# Patient Record
Sex: FEMALE | Race: BLACK OR AFRICAN AMERICAN | Employment: FULL TIME | URBAN - METROPOLITAN AREA
[De-identification: names, ages, dates, MRNs, and addresses within clinical notes are randomized per-mention and may not be internally consistent; named-entity substitution may affect disease eponyms.]

---

## 2018-01-19 ENCOUNTER — OFFICE VISIT (OUTPATIENT)
Dept: FAMILY MEDICINE CLINIC | Facility: CLINIC | Age: 43
End: 2018-01-19

## 2018-01-19 ENCOUNTER — LAB ENCOUNTER (OUTPATIENT)
Dept: LAB | Age: 43
End: 2018-01-19
Attending: FAMILY MEDICINE
Payer: COMMERCIAL

## 2018-01-19 VITALS
WEIGHT: 135 LBS | RESPIRATION RATE: 16 BRPM | DIASTOLIC BLOOD PRESSURE: 62 MMHG | BODY MASS INDEX: 23.92 KG/M2 | HEART RATE: 78 BPM | SYSTOLIC BLOOD PRESSURE: 100 MMHG | HEIGHT: 63 IN | TEMPERATURE: 99 F

## 2018-01-19 DIAGNOSIS — E53.8 VITAMIN B 12 DEFICIENCY: ICD-10-CM

## 2018-01-19 DIAGNOSIS — Z13.89 SCREENING FOR GENITOURINARY CONDITION: ICD-10-CM

## 2018-01-19 DIAGNOSIS — E55.9 VITAMIN D DEFICIENCY: ICD-10-CM

## 2018-01-19 DIAGNOSIS — Z00.00 PHYSICAL EXAM, ANNUAL: Primary | ICD-10-CM

## 2018-01-19 DIAGNOSIS — Z00.00 LABORATORY EXAMINATION ORDERED AS PART OF A ROUTINE GENERAL MEDICAL EXAMINATION: ICD-10-CM

## 2018-01-19 LAB
25-HYDROXYVITAMIN D (TOTAL): 10 NG/ML (ref 30–100)
ALBUMIN SERPL-MCNC: 4.1 G/DL (ref 3.5–4.8)
ALP LIVER SERPL-CCNC: 67 U/L (ref 37–98)
ALT SERPL-CCNC: 13 U/L (ref 14–54)
AST SERPL-CCNC: 14 U/L (ref 15–41)
BASOPHILS # BLD AUTO: 0.02 X10(3) UL (ref 0–0.1)
BASOPHILS NFR BLD AUTO: 0.3 %
BILIRUB SERPL-MCNC: 0.4 MG/DL (ref 0.1–2)
BILIRUB UR QL STRIP.AUTO: NEGATIVE
BUN BLD-MCNC: 10 MG/DL (ref 8–20)
CALCIUM BLD-MCNC: 8.8 MG/DL (ref 8.3–10.3)
CHLORIDE: 106 MMOL/L (ref 101–111)
CHOLEST SMN-MCNC: 184 MG/DL (ref ?–200)
CLARITY UR REFRACT.AUTO: CLEAR
CO2: 26 MMOL/L (ref 22–32)
COLOR UR AUTO: YELLOW
CREAT BLD-MCNC: 0.9 MG/DL (ref 0.55–1.02)
EOSINOPHIL # BLD AUTO: 0.06 X10(3) UL (ref 0–0.3)
EOSINOPHIL NFR BLD AUTO: 1 %
ERYTHROCYTE [DISTWIDTH] IN BLOOD BY AUTOMATED COUNT: 13.1 % (ref 11.5–16)
GLUCOSE BLD-MCNC: 82 MG/DL (ref 70–99)
GLUCOSE UR STRIP.AUTO-MCNC: NEGATIVE MG/DL
HAV AB SERPL IA-ACNC: 242 PG/ML (ref 193–986)
HCT VFR BLD AUTO: 40.8 % (ref 34–50)
HDLC SERPL-MCNC: 80 MG/DL (ref 45–?)
HDLC SERPL: 2.3 {RATIO} (ref ?–4.44)
HGB BLD-MCNC: 13.5 G/DL (ref 12–16)
IMMATURE GRANULOCYTE COUNT: 0.01 X10(3) UL (ref 0–1)
IMMATURE GRANULOCYTE RATIO %: 0.2 %
KETONES UR STRIP.AUTO-MCNC: NEGATIVE MG/DL
LDLC SERPL CALC-MCNC: 95 MG/DL (ref ?–130)
LEUKOCYTE ESTERASE UR QL STRIP.AUTO: NEGATIVE
LYMPHOCYTES # BLD AUTO: 2.26 X10(3) UL (ref 0.9–4)
LYMPHOCYTES NFR BLD AUTO: 37.3 %
M PROTEIN MFR SERPL ELPH: 8.1 G/DL (ref 6.1–8.3)
MCH RBC QN AUTO: 30.9 PG (ref 27–33.2)
MCHC RBC AUTO-ENTMCNC: 33.1 G/DL (ref 31–37)
MCV RBC AUTO: 93.4 FL (ref 81–100)
MONOCYTES # BLD AUTO: 0.45 X10(3) UL (ref 0.1–0.6)
MONOCYTES NFR BLD AUTO: 7.4 %
NEUTROPHIL ABS PRELIM: 3.26 X10 (3) UL (ref 1.3–6.7)
NEUTROPHILS # BLD AUTO: 3.26 X10(3) UL (ref 1.3–6.7)
NEUTROPHILS NFR BLD AUTO: 53.8 %
NITRITE UR QL STRIP.AUTO: NEGATIVE
NONHDLC SERPL-MCNC: 104 MG/DL (ref ?–130)
PH UR STRIP.AUTO: 6 [PH] (ref 4.5–8)
PLATELET # BLD AUTO: 243 10(3)UL (ref 150–450)
POTASSIUM SERPL-SCNC: 4.4 MMOL/L (ref 3.6–5.1)
PROT UR STRIP.AUTO-MCNC: NEGATIVE MG/DL
RBC # BLD AUTO: 4.37 X10(6)UL (ref 3.8–5.1)
RBC UR QL AUTO: NEGATIVE
RED CELL DISTRIBUTION WIDTH-SD: 45 FL (ref 35.1–46.3)
SODIUM SERPL-SCNC: 138 MMOL/L (ref 136–144)
SP GR UR STRIP.AUTO: 1.02 (ref 1–1.03)
TRIGL SERPL-MCNC: 47 MG/DL (ref ?–150)
TSI SER-ACNC: 0.9 MIU/ML (ref 0.35–5.5)
UROBILINOGEN UR STRIP.AUTO-MCNC: <2 MG/DL
VLDLC SERPL CALC-MCNC: 9 MG/DL (ref 5–40)
WBC # BLD AUTO: 6.1 X10(3) UL (ref 4–13)

## 2018-01-19 PROCEDURE — 82607 VITAMIN B-12: CPT

## 2018-01-19 PROCEDURE — 81003 URINALYSIS AUTO W/O SCOPE: CPT

## 2018-01-19 PROCEDURE — 85025 COMPLETE CBC W/AUTO DIFF WBC: CPT

## 2018-01-19 PROCEDURE — 84443 ASSAY THYROID STIM HORMONE: CPT

## 2018-01-19 PROCEDURE — 80061 LIPID PANEL: CPT

## 2018-01-19 PROCEDURE — 82306 VITAMIN D 25 HYDROXY: CPT

## 2018-01-19 PROCEDURE — 99396 PREV VISIT EST AGE 40-64: CPT | Performed by: FAMILY MEDICINE

## 2018-01-19 PROCEDURE — 36415 COLL VENOUS BLD VENIPUNCTURE: CPT

## 2018-01-19 PROCEDURE — 80053 COMPREHEN METABOLIC PANEL: CPT

## 2018-01-19 NOTE — PROGRESS NOTES
HPI:   Charanjit Magana is a 38year old female who presents for a complete physical exam. Symptoms: denies discharge, itching, burning or dysuria. Patient has no complains.       Immunization History  Administered            Date(s) Administered    Influen Comment: [facility]     Occ: hospital. : yes. Children: yes   Exercise: three times per week.   Diet: watches calories closely     REVIEW OF SYSTEMS:   GENERAL: feels well otherwise  SKIN: denies any unusual skin lesions  EYES:denies blurred vision or gregory Placed This Encounter      CBC W/DIFF      COMP METABOLIC PANEL      LIPID PANEL      URINALYSIS, ROUTINE      TSH W REFLEX TO FREE T4      VITAMIN B12      VITAMIN D, 25-HYDROXY    Meds & Refills for this Visit:  No prescriptions requested or ordered in t

## 2018-01-22 ENCOUNTER — TELEPHONE (OUTPATIENT)
Dept: FAMILY MEDICINE CLINIC | Facility: CLINIC | Age: 43
End: 2018-01-22

## 2018-01-22 DIAGNOSIS — E55.9 VITAMIN D DEFICIENCY: Primary | ICD-10-CM

## 2018-01-22 DIAGNOSIS — E53.8 VITAMIN B12 DEFICIENCY: ICD-10-CM

## 2018-01-22 PROCEDURE — 96372 THER/PROPH/DIAG INJ SC/IM: CPT | Performed by: FAMILY MEDICINE

## 2018-01-22 RX ORDER — ERGOCALCIFEROL 1.25 MG/1
50000 CAPSULE ORAL WEEKLY
Qty: 12 CAPSULE | Refills: 0 | Status: SHIPPED | OUTPATIENT
Start: 2018-01-22 | End: 2018-01-22

## 2018-01-22 RX ORDER — CYANOCOBALAMIN 1000 UG/ML
1000 INJECTION INTRAMUSCULAR; SUBCUTANEOUS ONCE
Status: COMPLETED | OUTPATIENT
Start: 2018-01-22 | End: 2018-01-22

## 2018-01-22 RX ADMIN — CYANOCOBALAMIN 1000 MCG: 1000 INJECTION INTRAMUSCULAR; SUBCUTANEOUS at 11:45:00

## 2018-01-22 NOTE — TELEPHONE ENCOUNTER
Call to pt-advised of dr burrows's recommendation noted below. Pt agrees with plan. Informed I will notify pharmacy to cancel prescription vitamin d order. Informed edward lab order in place for 3 months.    Patient voices understanding/agrees with plan

## 2018-01-22 NOTE — TELEPHONE ENCOUNTER
Pt called back. She wanted to let Dr. Kendrick Mom know she can not take prescription vitamin D . It causes insomnia, nausea and a metallic taste in her mouth. She would like to know what to do in place of this?  She does not have this problem with the over

## 2018-01-22 NOTE — TELEPHONE ENCOUNTER
Thank you for update. Do not take prescription vitamin D. We can  this from the pharmacy. Take vitamin D3 5000 units once a day for 3 months recheck vitamin D in 3 months to make sure that the levels are at the normal ranges.

## 2018-01-23 ENCOUNTER — NURSE ONLY (OUTPATIENT)
Dept: FAMILY MEDICINE CLINIC | Facility: CLINIC | Age: 43
End: 2018-01-23

## 2018-01-23 DIAGNOSIS — E53.8 VITAMIN B 12 DEFICIENCY: Primary | ICD-10-CM

## 2018-02-23 ENCOUNTER — NURSE ONLY (OUTPATIENT)
Dept: FAMILY MEDICINE CLINIC | Facility: CLINIC | Age: 43
End: 2018-02-23

## 2018-02-23 DIAGNOSIS — E53.8 VITAMIN B 12 DEFICIENCY: Primary | ICD-10-CM

## 2018-02-23 PROCEDURE — 96372 THER/PROPH/DIAG INJ SC/IM: CPT | Performed by: FAMILY MEDICINE

## 2018-02-23 RX ADMIN — CYANOCOBALAMIN 1000 MCG: 1000 INJECTION INTRAMUSCULAR; SUBCUTANEOUS at 09:02:00

## 2018-04-19 ENCOUNTER — HOSPITAL ENCOUNTER (EMERGENCY)
Facility: HOSPITAL | Age: 43
Discharge: LEFT BEFORE DISCHARGE | End: 2018-04-19
Payer: COMMERCIAL

## 2018-07-27 ENCOUNTER — HOSPITAL ENCOUNTER (OUTPATIENT)
Dept: GENERAL RADIOLOGY | Age: 43
Discharge: HOME | End: 2018-07-27
Attending: FAMILY MEDICINE
Payer: COMMERCIAL

## 2018-07-27 ENCOUNTER — OFFICE VISIT (OUTPATIENT)
Dept: FAMILY MEDICINE CLINIC | Facility: CLINIC | Age: 43
End: 2018-07-27
Payer: COMMERCIAL

## 2018-07-27 VITALS
BODY MASS INDEX: 24.8 KG/M2 | RESPIRATION RATE: 15 BRPM | WEIGHT: 140 LBS | HEIGHT: 63 IN | SYSTOLIC BLOOD PRESSURE: 114 MMHG | HEART RATE: 68 BPM | DIASTOLIC BLOOD PRESSURE: 70 MMHG

## 2018-07-27 DIAGNOSIS — Z87.39 HISTORY OF CLOSED SHOULDER DISLOCATION: ICD-10-CM

## 2018-07-27 DIAGNOSIS — M25.511 RIGHT SHOULDER PAIN, UNSPECIFIED CHRONICITY: ICD-10-CM

## 2018-07-27 DIAGNOSIS — M25.511 RIGHT SHOULDER PAIN, UNSPECIFIED CHRONICITY: Primary | ICD-10-CM

## 2018-07-27 PROCEDURE — 73030 X-RAY EXAM OF SHOULDER: CPT | Performed by: FAMILY MEDICINE

## 2018-07-27 PROCEDURE — 99213 OFFICE O/P EST LOW 20 MIN: CPT | Performed by: FAMILY MEDICINE

## 2018-07-27 NOTE — PROGRESS NOTES
Hetal Lopez is a 38year old female. cc shoulder pain, status post dislocation  HPI:   Is coming for evaluation of the right shoulder pain.   Initially 3 years ago she had dislocation anterior inferior of the right shoulder after she fell down on the st clubbing or edema  Musculoskeletal good range of motion of the shoulders bilaterally, there is some popping crunching noises right shoulder with movements, symmetric strength bilateral upper extremities normal   Psychiatric - alert  and oriented x3, no

## 2018-09-05 PROBLEM — M25.311 SHOULDER INSTABILITY, RIGHT: Status: ACTIVE | Noted: 2018-09-05

## 2018-09-11 ENCOUNTER — HOSPITAL ENCOUNTER (OUTPATIENT)
Dept: MAMMOGRAPHY | Age: 43
Discharge: HOME | End: 2018-09-11
Attending: OBSTETRICS & GYNECOLOGY
Payer: COMMERCIAL

## 2018-09-11 ENCOUNTER — HOSPITAL ENCOUNTER (OUTPATIENT)
Dept: MRI IMAGING | Facility: HOSPITAL | Age: 43
Discharge: HOME | End: 2018-09-11
Attending: ORTHOPAEDIC SURGERY
Payer: COMMERCIAL

## 2018-09-11 DIAGNOSIS — Z12.31 VISIT FOR SCREENING MAMMOGRAM: ICD-10-CM

## 2018-09-11 DIAGNOSIS — M25.311 SHOULDER INSTABILITY, RIGHT: ICD-10-CM

## 2018-09-11 PROCEDURE — 77067 SCR MAMMO BI INCL CAD: CPT | Performed by: OBSTETRICS & GYNECOLOGY

## 2018-09-11 PROCEDURE — 77063 BREAST TOMOSYNTHESIS BI: CPT | Performed by: OBSTETRICS & GYNECOLOGY

## 2018-09-11 PROCEDURE — 73221 MRI JOINT UPR EXTREM W/O DYE: CPT | Performed by: ORTHOPAEDIC SURGERY

## 2018-10-11 ENCOUNTER — APPOINTMENT (OUTPATIENT)
Dept: PHYSICAL THERAPY | Age: 43
End: 2018-10-11
Attending: ORTHOPAEDIC SURGERY
Payer: COMMERCIAL

## 2018-10-16 ENCOUNTER — OFFICE VISIT (OUTPATIENT)
Dept: PHYSICAL THERAPY | Age: 43
End: 2018-10-16
Attending: ORTHOPAEDIC SURGERY
Payer: COMMERCIAL

## 2018-10-16 PROCEDURE — 97110 THERAPEUTIC EXERCISES: CPT

## 2018-10-16 PROCEDURE — 97161 PT EVAL LOW COMPLEX 20 MIN: CPT

## 2018-10-16 NOTE — PROGRESS NOTES
SHOULDER EVALUATION:   Referring Physician: Dr. Fely Chase, [facility]  Diagnosis: R shoulder instability Date of Service: 10/16/2018     PATIENT Bryant Sanchez is a 38year old female who presents to therapy today with complaints of R shoulder pain shoulder stabilizers as well as the rotators in elevated positions in order toe decrease pain and reduce risk of dislocation. Pt was instructed a HEP to begin working toward her goals.   Dai would benefit from skilled Physical Therapy to address the Therapy; Therapeutic Exercises; Neuromuscular Re-education; Therapeutic Activity;  Electrical Stim; Ultrasound; prn.    Education or treatment limitation: None  Rehab Potential:good    FOTO: 78/100    Patient/Family/Caregiver was advised of these findings,

## 2018-10-18 ENCOUNTER — APPOINTMENT (OUTPATIENT)
Dept: PHYSICAL THERAPY | Age: 43
End: 2018-10-18
Attending: ORTHOPAEDIC SURGERY
Payer: COMMERCIAL

## 2018-10-23 ENCOUNTER — OFFICE VISIT (OUTPATIENT)
Dept: PHYSICAL THERAPY | Age: 43
End: 2018-10-23
Attending: ORTHOPAEDIC SURGERY
Payer: COMMERCIAL

## 2018-10-23 PROCEDURE — 97530 THERAPEUTIC ACTIVITIES: CPT

## 2018-10-23 PROCEDURE — 97110 THERAPEUTIC EXERCISES: CPT

## 2018-10-23 NOTE — PROGRESS NOTES
Dx: Shoulder instability, right        Authorized # of Visits:  [id number]         Next MD visit: none scheduled  Fall Risk: standard         Precautions: n/a             Subjective:  Pt reports she was not sore after our last visit.   Objective:  Strength: IR: 4+ i

## 2018-10-25 ENCOUNTER — APPOINTMENT (OUTPATIENT)
Dept: PHYSICAL THERAPY | Age: 43
End: 2018-10-25
Attending: ORTHOPAEDIC SURGERY
Payer: COMMERCIAL

## 2018-10-30 ENCOUNTER — APPOINTMENT (OUTPATIENT)
Dept: CT IMAGING | Age: 43
End: 2018-10-30
Attending: FAMILY MEDICINE
Payer: COMMERCIAL

## 2018-10-30 ENCOUNTER — HOSPITAL ENCOUNTER (OUTPATIENT)
Age: 43
Discharge: HOME | End: 2018-10-30
Attending: FAMILY MEDICINE
Payer: COMMERCIAL

## 2018-10-30 ENCOUNTER — APPOINTMENT (OUTPATIENT)
Dept: PHYSICAL THERAPY | Age: 43
End: 2018-10-30
Attending: ORTHOPAEDIC SURGERY
Payer: COMMERCIAL

## 2018-10-30 VITALS
OXYGEN SATURATION: 100 % | HEART RATE: 72 BPM | SYSTOLIC BLOOD PRESSURE: 111 MMHG | TEMPERATURE: 98 F | RESPIRATION RATE: 16 BRPM | DIASTOLIC BLOOD PRESSURE: 63 MMHG

## 2018-10-30 DIAGNOSIS — R10.9 ABDOMINAL CRAMPING: Primary | ICD-10-CM

## 2018-10-30 DIAGNOSIS — R14.0 BLOATING SYMPTOM: ICD-10-CM

## 2018-10-30 DIAGNOSIS — R19.7 INTERMITTENT DIARRHEA: ICD-10-CM

## 2018-10-30 PROCEDURE — 96361 HYDRATE IV INFUSION ADD-ON: CPT

## 2018-10-30 PROCEDURE — 81002 URINALYSIS NONAUTO W/O SCOPE: CPT | Performed by: FAMILY MEDICINE

## 2018-10-30 PROCEDURE — 99214 OFFICE O/P EST MOD 30 MIN: CPT

## 2018-10-30 PROCEDURE — S0028 INJECTION, FAMOTIDINE, 20 MG: HCPCS

## 2018-10-30 PROCEDURE — 80047 BASIC METABLC PNL IONIZED CA: CPT

## 2018-10-30 PROCEDURE — 96374 THER/PROPH/DIAG INJ IV PUSH: CPT

## 2018-10-30 PROCEDURE — 81025 URINE PREGNANCY TEST: CPT | Performed by: FAMILY MEDICINE

## 2018-10-30 PROCEDURE — 85025 COMPLETE CBC W/AUTO DIFF WBC: CPT | Performed by: FAMILY MEDICINE

## 2018-10-30 PROCEDURE — 74177 CT ABD & PELVIS W/CONTRAST: CPT | Performed by: FAMILY MEDICINE

## 2018-10-30 PROCEDURE — 99215 OFFICE O/P EST HI 40 MIN: CPT

## 2018-10-30 PROCEDURE — 96375 TX/PRO/DX INJ NEW DRUG ADDON: CPT

## 2018-10-30 RX ORDER — DICYCLOMINE HCL 20 MG
20 TABLET ORAL ONCE
Status: COMPLETED | OUTPATIENT
Start: 2018-10-30 | End: 2018-10-30

## 2018-10-30 RX ORDER — ONDANSETRON 4 MG/1
4 TABLET, ORALLY DISINTEGRATING ORAL EVERY 6 HOURS PRN
Qty: 12 TABLET | Refills: 0 | Status: SHIPPED | OUTPATIENT
Start: 2018-10-30 | End: 2018-11-02

## 2018-10-30 RX ORDER — SODIUM CHLORIDE 9 MG/ML
1000 INJECTION, SOLUTION INTRAVENOUS ONCE
Status: COMPLETED | OUTPATIENT
Start: 2018-10-30 | End: 2018-10-30

## 2018-10-30 RX ORDER — ONDANSETRON 2 MG/ML
4 INJECTION INTRAMUSCULAR; INTRAVENOUS ONCE
Status: COMPLETED | OUTPATIENT
Start: 2018-10-30 | End: 2018-10-30

## 2018-10-30 RX ORDER — DICYCLOMINE HCL 20 MG
20 TABLET ORAL 4 TIMES DAILY PRN
Qty: 28 TABLET | Refills: 0 | Status: SHIPPED | OUTPATIENT
Start: 2018-10-30 | End: 2018-11-06

## 2018-10-30 RX ORDER — FAMOTIDINE 10 MG/ML
20 INJECTION, SOLUTION INTRAVENOUS ONCE
Status: COMPLETED | OUTPATIENT
Start: 2018-10-30 | End: 2018-10-30

## 2018-10-30 RX ORDER — DIPHENHYDRAMINE HYDROCHLORIDE 50 MG/ML
25 INJECTION INTRAMUSCULAR; INTRAVENOUS ONCE
Status: COMPLETED | OUTPATIENT
Start: 2018-10-30 | End: 2018-10-30

## 2018-10-30 NOTE — ED NOTES
Responded to pt call. Pt c/o  Hives on the right side of face that was not there before. She states she is maybe allergicto the contrast. Pt delmy any shortness of breath, pt alert, breathing easy not in any distress.  Dr. Brandon Mcgovern at bedside

## 2018-10-30 NOTE — ED NOTES
Pt had returned from CT with 3 hives to face and 1 to neck and 1 to back. Pt denies any SOB or swelling, and pt's vs stable.

## 2018-10-30 NOTE — ED NOTES
Pt states she is feeling tired, hives decreased in redness and size.   Pt is resting comfortably with blanket and ice chips

## 2018-10-30 NOTE — ED PROVIDER NOTES
Patient Seen in: [facility]    History   Patient presents with:  Abdomen/Flank Pain (GI/)    Stated Complaint: abdominal pain    HPI  46-year-old female coming in with complaints of having had abdominal cramping, some mucousy good conversation, answering appropriately   SKIN: No pallor, no erythema, no cyanosis, warm and dry  Eyes: wnl, normal conjunctiva   HEAD: Normocephalic, atraumatic  EENT: OP - wnl, moist, Nares normal  NECK: FROM, supple  LUNGS: CTAB, no RRW  CV: RRR  AB daily as needed. Dispense:  28 tablet          Refill:  0    Ct Abdomen+pelvis(contrast Only)(cpt=74177)    Result Date: 10/30/2018  PROCEDURE:  CT ABDOMEN+PELVIS (CONTRAST ONLY) (CPT=74177)  COMPARISON:  None.   INDICATIONS:  Severe abdominal pain Atul Been, DO            All her labs are normal , normal white count and no signs of colitis or ileitis noted on CT scan - no prior hx of dye allergy and has no reaction to shellfish - received contrast here and then developed hives - a couple of esperanza

## 2018-10-30 NOTE — ED NOTES
Pt is no longer itchy and all hives have resolved, Vss, and pt is drinking sips of water.   Denies swelling or SOB

## 2018-10-30 NOTE — ED INITIAL ASSESSMENT (HPI)
Pt had constipation 10 days ago, and now she is having diarrhea, and now her stools have mucous and she is having severe abdominal pain.   Denies any urinary symptoms, and has an IUD

## 2018-11-01 ENCOUNTER — APPOINTMENT (OUTPATIENT)
Dept: PHYSICAL THERAPY | Age: 43
End: 2018-11-01
Attending: ORTHOPAEDIC SURGERY
Payer: COMMERCIAL

## 2018-11-06 ENCOUNTER — APPOINTMENT (OUTPATIENT)
Dept: PHYSICAL THERAPY | Age: 43
End: 2018-11-06
Attending: ORTHOPAEDIC SURGERY
Payer: COMMERCIAL

## 2018-12-15 ENCOUNTER — NURSE ONLY (OUTPATIENT)
Dept: FAMILY MEDICINE CLINIC | Facility: CLINIC | Age: 43
End: 2018-12-15
Payer: COMMERCIAL

## 2018-12-15 VITALS
BODY MASS INDEX: 26.68 KG/M2 | TEMPERATURE: 99 F | HEIGHT: 62 IN | OXYGEN SATURATION: 99 % | HEART RATE: 79 BPM | WEIGHT: 145 LBS | SYSTOLIC BLOOD PRESSURE: 123 MMHG | DIASTOLIC BLOOD PRESSURE: 79 MMHG | RESPIRATION RATE: 18 BRPM

## 2018-12-15 DIAGNOSIS — R30.0 DYSURIA: Primary | ICD-10-CM

## 2018-12-15 PROCEDURE — 99213 OFFICE O/P EST LOW 20 MIN: CPT | Performed by: NURSE PRACTITIONER

## 2018-12-15 PROCEDURE — 81003 URINALYSIS AUTO W/O SCOPE: CPT | Performed by: NURSE PRACTITIONER

## 2018-12-15 PROCEDURE — 87077 CULTURE AEROBIC IDENTIFY: CPT | Performed by: NURSE PRACTITIONER

## 2018-12-15 PROCEDURE — 87186 SC STD MICRODIL/AGAR DIL: CPT | Performed by: NURSE PRACTITIONER

## 2018-12-15 PROCEDURE — 87086 URINE CULTURE/COLONY COUNT: CPT | Performed by: NURSE PRACTITIONER

## 2018-12-15 RX ORDER — NITROFURANTOIN 25; 75 MG/1; MG/1
100 CAPSULE ORAL 2 TIMES DAILY
Qty: 14 CAPSULE | Refills: 0 | Status: SHIPPED | OUTPATIENT
Start: 2018-12-15 | End: 2018-12-22

## 2018-12-15 NOTE — PROGRESS NOTES
Timothy Lima is a 41year old female. HPI:   Patient presents with urinary symptoms. Complaining of urinary frequency, urgency, dysuria, suprapubic pain. Denies back pain, fever, hematuria.   Duration: 2 days  Sexual activity: two weeks ago  Vaginal di Negative/Trace mg/dL    UROBILINOGEN,SEMI-QN 0.2 0.0 - 1.9 mg/dL    NITRITE, URINE neg Negative    LEUKOCYTES small Negative    APPEARANCE clear Clear    URINE-COLOR yellow Yellow    Multistix Lot# [id number] Numeric    Multistix Expiration Date 08/31/2019 Da

## 2018-12-20 ENCOUNTER — OFFICE VISIT (OUTPATIENT)
Dept: FAMILY MEDICINE CLINIC | Facility: CLINIC | Age: 43
End: 2018-12-20
Payer: COMMERCIAL

## 2018-12-20 VITALS
RESPIRATION RATE: 16 BRPM | TEMPERATURE: 98 F | SYSTOLIC BLOOD PRESSURE: 122 MMHG | HEIGHT: 63 IN | WEIGHT: 141 LBS | DIASTOLIC BLOOD PRESSURE: 74 MMHG | HEART RATE: 84 BPM | BODY MASS INDEX: 24.98 KG/M2

## 2018-12-20 DIAGNOSIS — R11.0 NAUSEA: ICD-10-CM

## 2018-12-20 DIAGNOSIS — N30.00 ACUTE CYSTITIS WITHOUT HEMATURIA: ICD-10-CM

## 2018-12-20 DIAGNOSIS — R19.7 DIARRHEA, UNSPECIFIED TYPE: Primary | ICD-10-CM

## 2018-12-20 DIAGNOSIS — R10.9 ABDOMINAL CRAMPS: ICD-10-CM

## 2018-12-20 PROCEDURE — 99214 OFFICE O/P EST MOD 30 MIN: CPT | Performed by: FAMILY MEDICINE

## 2018-12-20 RX ORDER — DICYCLOMINE HCL 20 MG
20 TABLET ORAL
Qty: 30 TABLET | Refills: 1 | Status: SHIPPED | OUTPATIENT
Start: 2018-12-20 | End: 2019-01-04

## 2018-12-20 NOTE — PATIENT INSTRUCTIONS
Dicyclomine 20 mg 1 tablet every 6 hours as needed  for abdominal cramps. Align probiotic over-the-counter. Call GI for evaluation. Do test  from stool to rule out parasites and bacterial infection.

## 2018-12-20 NOTE — PROGRESS NOTES
Tracy Mclain is a 41year old female. cc abdominal cramping, diarrhea, nausea recent UTI  HPI:   Patient is coming to the office complaining of having abdominal cramping and diarrhea intermittently with constipation.   She has those symptoms for very cam throat  Neck no neck pain  RESPIRATORY: denies shortness of breath with exertion  CARDIOVASCULAR: denies chest pain on exertion  GI: Abdominal cramps, diarrhea with constipation on and off, denies heartburn  NEURO: denies headaches  Psych normal mood.     E Sensitive      Trimethoprim/Sulfa >=320 Resistant    PROCEDURE:  CT ABDOMEN+PELVIS (CONTRAST ONLY) (CPT=74177)     COMPARISON:  None. INDICATIONS:  Severe abdominal pain diffusely for 10 days.      TECHNIQUE:  CT scanning was performed from the dome of ASSESSMENT AND PLAN:     Diarrhea, unspecified type  (primary encounter diagnosis)  Abdominal cramps  Nausea  Acute cystitis without hematuria    Orders Placed This Encounter      Stool Culture w/Shigatoxin [E]      Ova and Parasite w Giardia E

## 2019-01-04 RX ORDER — DICYCLOMINE HCL 20 MG
20 TABLET ORAL
Qty: 30 TABLET | Refills: 1 | Status: SHIPPED | OUTPATIENT
Start: 2019-01-04 | End: 2020-11-12 | Stop reason: ALTCHOICE

## 2019-01-30 ENCOUNTER — APPOINTMENT (OUTPATIENT)
Dept: LAB | Facility: HOSPITAL | Age: 44
End: 2019-01-30
Attending: INTERNAL MEDICINE
Payer: COMMERCIAL

## 2019-01-30 DIAGNOSIS — R10.84 GENERALIZED ABDOMINAL PAIN: ICD-10-CM

## 2019-01-30 DIAGNOSIS — R19.7 DIARRHEA, UNSPECIFIED TYPE: ICD-10-CM

## 2019-01-30 DIAGNOSIS — R14.0 BLOATING: ICD-10-CM

## 2019-01-30 DIAGNOSIS — R63.5 WEIGHT GAIN: ICD-10-CM

## 2019-01-30 LAB
ALBUMIN SERPL-MCNC: 3.7 G/DL (ref 3.1–4.5)
ALBUMIN/GLOB SERPL: 1 {RATIO} (ref 1–2)
ALP LIVER SERPL-CCNC: 68 U/L (ref 37–98)
ALT SERPL-CCNC: 12 U/L (ref 14–54)
ANION GAP SERPL CALC-SCNC: 5 MMOL/L (ref 0–18)
AST SERPL-CCNC: 15 U/L (ref 15–41)
BILIRUB SERPL-MCNC: 0.5 MG/DL (ref 0.1–2)
BUN BLD-MCNC: 10 MG/DL (ref 8–20)
BUN/CREAT SERPL: 10.4 (ref 10–20)
CALCIUM BLD-MCNC: 8.4 MG/DL (ref 8.3–10.3)
CHLORIDE SERPL-SCNC: 109 MMOL/L (ref 101–111)
CO2 SERPL-SCNC: 25 MMOL/L (ref 22–32)
CREAT BLD-MCNC: 0.96 MG/DL (ref 0.55–1.02)
CRP SERPL-MCNC: 0.47 MG/DL (ref ?–1)
GLOBULIN PLAS-MCNC: 3.8 G/DL (ref 2.8–4.4)
GLUCOSE BLD-MCNC: 81 MG/DL (ref 70–99)
M PROTEIN MFR SERPL ELPH: 7.5 G/DL (ref 6.4–8.2)
OSMOLALITY SERPL CALC.SUM OF ELEC: 286 MOSM/KG (ref 275–295)
POTASSIUM SERPL-SCNC: 4 MMOL/L (ref 3.6–5.1)
SED RATE-ML: 6 MM/HR (ref 0–25)
SODIUM SERPL-SCNC: 139 MMOL/L (ref 136–144)
TSI SER-ACNC: 1.33 MIU/ML (ref 0.35–5.5)

## 2019-01-30 PROCEDURE — 80053 COMPREHEN METABOLIC PANEL: CPT

## 2019-01-30 PROCEDURE — 86140 C-REACTIVE PROTEIN: CPT

## 2019-01-30 PROCEDURE — 36415 COLL VENOUS BLD VENIPUNCTURE: CPT

## 2019-01-30 PROCEDURE — 85652 RBC SED RATE AUTOMATED: CPT

## 2019-01-30 PROCEDURE — 84443 ASSAY THYROID STIM HORMONE: CPT

## 2019-02-21 PROBLEM — R10.9 STOMACH ACHE: Status: ACTIVE | Noted: 2019-02-21

## 2019-02-21 PROBLEM — R10.84 ABDOMINAL PAIN, GENERALIZED: Status: ACTIVE | Noted: 2019-02-21

## 2019-02-21 PROBLEM — K63.5 COLON POLYP: Status: ACTIVE | Noted: 2019-02-21

## 2019-02-21 PROBLEM — R19.4 FREQUENT BOWEL MOVEMENTS: Status: ACTIVE | Noted: 2019-02-21

## 2019-03-07 PROBLEM — K59.04 FUNCTIONAL CONSTIPATION: Status: ACTIVE | Noted: 2019-03-07

## 2019-03-07 PROBLEM — K29.70 HELICOBACTER POSITIVE GASTRITIS: Status: ACTIVE | Noted: 2019-03-07

## 2019-03-07 PROBLEM — B96.81 HELICOBACTER POSITIVE GASTRITIS: Status: ACTIVE | Noted: 2019-03-07

## 2019-03-27 ENCOUNTER — HOSPITAL ENCOUNTER (OUTPATIENT)
Dept: CT IMAGING | Facility: HOSPITAL | Age: 44
Discharge: HOME | End: 2019-03-27
Attending: FAMILY MEDICINE

## 2019-03-27 DIAGNOSIS — Z13.9 ENCOUNTER FOR SCREENING: ICD-10-CM

## 2019-03-27 DIAGNOSIS — Z13.6 SCREENING FOR CARDIOVASCULAR CONDITION: ICD-10-CM

## 2019-04-01 DIAGNOSIS — R16.0 MASS OF LEFT LOBE OF LIVER: ICD-10-CM

## 2019-04-01 DIAGNOSIS — R16.0 LIVER MASS, RIGHT LOBE: Primary | ICD-10-CM

## 2019-04-01 RX ORDER — PREDNISONE 50 MG/1
TABLET ORAL
Qty: 3 TABLET | Refills: 0 | Status: SHIPPED | OUTPATIENT
Start: 2019-04-01 | End: 2019-04-29 | Stop reason: ALTCHOICE

## 2019-04-11 ENCOUNTER — TELEPHONE (OUTPATIENT)
Dept: FAMILY MEDICINE CLINIC | Facility: CLINIC | Age: 44
End: 2019-04-11

## 2019-04-11 DIAGNOSIS — R16.0 LIVER MASS: Primary | ICD-10-CM

## 2019-04-11 NOTE — TELEPHONE ENCOUNTER
Bisi Ascencio [employer] called. The order for the Ct of the abdomen w/ contrast needs to be changed per radiologists recommendation to CT of the Abdomen with and without contrast. Triphasic liver protocol. Order placed.

## 2019-04-12 ENCOUNTER — HOSPITAL ENCOUNTER (EMERGENCY)
Facility: HOSPITAL | Age: 44
Discharge: HOME | End: 2019-04-12
Attending: EMERGENCY MEDICINE
Payer: COMMERCIAL

## 2019-04-12 ENCOUNTER — HOSPITAL ENCOUNTER (OUTPATIENT)
Dept: CT IMAGING | Facility: HOSPITAL | Age: 44
Discharge: HOME | End: 2019-04-12
Attending: FAMILY MEDICINE
Payer: COMMERCIAL

## 2019-04-12 VITALS
BODY MASS INDEX: 25.76 KG/M2 | DIASTOLIC BLOOD PRESSURE: 90 MMHG | SYSTOLIC BLOOD PRESSURE: 138 MMHG | TEMPERATURE: 98 F | OXYGEN SATURATION: 100 % | HEIGHT: 62 IN | HEART RATE: 113 BPM | WEIGHT: 140 LBS | RESPIRATION RATE: 16 BRPM

## 2019-04-12 VITALS
OXYGEN SATURATION: 99 % | DIASTOLIC BLOOD PRESSURE: 74 MMHG | RESPIRATION RATE: 26 BRPM | HEART RATE: 174 BPM | SYSTOLIC BLOOD PRESSURE: 150 MMHG

## 2019-04-12 DIAGNOSIS — T78.40XA ALLERGIC REACTION, INITIAL ENCOUNTER: Primary | ICD-10-CM

## 2019-04-12 DIAGNOSIS — R16.0 LIVER MASS: ICD-10-CM

## 2019-04-12 PROCEDURE — 96374 THER/PROPH/DIAG INJ IV PUSH: CPT

## 2019-04-12 PROCEDURE — S0028 INJECTION, FAMOTIDINE, 20 MG: HCPCS | Performed by: PHYSICIAN ASSISTANT

## 2019-04-12 PROCEDURE — 96361 HYDRATE IV INFUSION ADD-ON: CPT

## 2019-04-12 PROCEDURE — 82565 ASSAY OF CREATININE: CPT

## 2019-04-12 PROCEDURE — 99284 EMERGENCY DEPT VISIT MOD MDM: CPT

## 2019-04-12 PROCEDURE — 74170 CT ABD WO CNTRST FLWD CNTRST: CPT | Performed by: FAMILY MEDICINE

## 2019-04-12 RX ORDER — PREDNISONE 20 MG/1
40 TABLET ORAL DAILY
Qty: 8 TABLET | Refills: 0 | Status: SHIPPED | OUTPATIENT
Start: 2019-04-12 | End: 2019-04-16

## 2019-04-12 RX ORDER — EPINEPHRINE 0.3 MG/.3ML
0.3 INJECTION SUBCUTANEOUS
Qty: 1 EACH | Refills: 0 | Status: SHIPPED | OUTPATIENT
Start: 2019-04-12 | End: 2019-05-12

## 2019-04-12 RX ORDER — FAMOTIDINE 10 MG/ML
20 INJECTION, SOLUTION INTRAVENOUS ONCE
Status: COMPLETED | OUTPATIENT
Start: 2019-04-12 | End: 2019-04-12

## 2019-04-12 NOTE — IMAGING NOTE
This RN was called to CT for post IV Dye infusion reaction. Pt pre-medicated with 13 hour prep. Upon arrival pt a/0x3, no respiratory distress noted, pt with 1 hive on right cheek, 1 hive on right neck, hives on back, and 1 hive on left abdomen.  Pt

## 2019-04-12 NOTE — ED PROVIDER NOTES
Patient Seen in: [facility] Emergency Department    History   Patient presents with: Allergic Rxn Allergies (immune)    Stated Complaint: Allergic rx     HPI    Patient is a 51-year-old female.   Patient was sent from the CT department for allergic well groomed, alert and aware x 3  Neck: Supple, full range of motion, no thyromegaly or lymphadenopathy. Eye examination: EOMs are intact, normal conjunctival  ENT: Oropharynx is completely patent. No evidence of angioedema.   No stridor, drooling, voice 0    EPINEPHrine 0.3 MG/0.3ML Injection Solution Auto-injector  Inject 0.3 mL (1 each total) into the muscle daily as needed. Qty: 1 each Refills: 0    !! - Potential duplicate medications found. Please discuss with provider.

## 2019-04-12 NOTE — IMAGING NOTE
PT was premedicated with 13 hour prep. 5 min post contrast patient developed hives. Rad RN was called and patient was taken to the ER with RN escort.

## 2019-04-12 NOTE — ED INITIAL ASSESSMENT (HPI)
Patient in CT premedicated for allergy to contrast dye. 5 minutes after receiving contrast developed hives to cheek, neck, belly and back. Denies SOB or difficulty swallowing.

## 2019-04-24 ENCOUNTER — LAB ENCOUNTER (OUTPATIENT)
Dept: LAB | Facility: HOSPITAL | Age: 44
End: 2019-04-24
Attending: INTERNAL MEDICINE
Payer: COMMERCIAL

## 2019-04-24 DIAGNOSIS — B96.81 HELICOBACTER POSITIVE GASTRITIS: ICD-10-CM

## 2019-04-24 DIAGNOSIS — K29.70 HELICOBACTER POSITIVE GASTRITIS: ICD-10-CM

## 2019-04-24 PROCEDURE — 83013 H PYLORI (C-13) BREATH: CPT

## 2019-05-11 ENCOUNTER — OFFICE VISIT (OUTPATIENT)
Dept: INTERNAL MEDICINE CLINIC | Facility: CLINIC | Age: 44
End: 2019-05-11
Payer: COMMERCIAL

## 2019-05-11 VITALS
WEIGHT: 148.5 LBS | RESPIRATION RATE: 16 BRPM | TEMPERATURE: 98 F | HEART RATE: 88 BPM | BODY MASS INDEX: 25.99 KG/M2 | SYSTOLIC BLOOD PRESSURE: 118 MMHG | DIASTOLIC BLOOD PRESSURE: 72 MMHG | HEIGHT: 63.5 IN | OXYGEN SATURATION: 98 %

## 2019-05-11 DIAGNOSIS — Z12.31 ENCOUNTER FOR SCREENING MAMMOGRAM FOR BREAST CANCER: ICD-10-CM

## 2019-05-11 DIAGNOSIS — Z12.4 SCREENING FOR CERVICAL CANCER: ICD-10-CM

## 2019-05-11 DIAGNOSIS — Z00.00 ROUTINE GENERAL MEDICAL EXAMINATION AT A HEALTH CARE FACILITY: Primary | ICD-10-CM

## 2019-05-11 PROCEDURE — 80061 LIPID PANEL: CPT | Performed by: INTERNAL MEDICINE

## 2019-05-11 PROCEDURE — 99386 PREV VISIT NEW AGE 40-64: CPT | Performed by: INTERNAL MEDICINE

## 2019-05-11 RX ORDER — ACETAMINOPHEN 160 MG
2000 TABLET,DISINTEGRATING ORAL DAILY
COMMUNITY
End: 2020-11-12 | Stop reason: ALTCHOICE

## 2019-05-11 RX ORDER — PYRIDOXINE HCL (VITAMIN B6) 50 MG
500 TABLET ORAL DAILY
COMMUNITY
End: 2020-11-12 | Stop reason: ALTCHOICE

## 2019-05-11 RX ORDER — LEVOCETIRIZINE DIHYDROCHLORIDE 5 MG/1
5 TABLET, FILM COATED ORAL EVERY EVENING
COMMUNITY

## 2019-05-11 NOTE — PROGRESS NOTES
Henry Crain is a 41year old female. HPI:   Patient presents for a physical exam.  1. Functional Constipation: fair controlled with linzess. 2. Radiocontrast Media Allergy: following with allergist. On xyzal daily.    3. Lifestyle: does not exerci CA   • Heart Disorder Paternal Grandmother         MI 57'S   • Breast Cancer Paternal Grandmother 54   • Hypertension Brother       Past Medical History:   Diagnosis Date   • Abdominal pain    • Bloating    • Constipation    • Diarrhea, unspecified    • Fo healthy plant based nutrition, regular exercise, and practicing mindfulness. We outlined small, achievable goals. # H.pylori gastritis: completed treatment and breath test neg in 3/2019. # Mirena IUD: needs replaced this year.  She already has a gynecol

## 2019-05-11 NOTE — PATIENT INSTRUCTIONS
Please start vitamin D3 2000 units once daily for life. Please make sure your gynecologist sends me records of your pap smear. You need 3 eight ounce servings of calcium/vitamin D daily.  This includes spinach, kale, broccoli, almonds, milk, yogurt,

## 2019-05-14 ENCOUNTER — APPOINTMENT (OUTPATIENT)
Dept: OTHER | Facility: HOSPITAL | Age: 44
End: 2019-05-14
Attending: PREVENTIVE MEDICINE

## 2019-10-29 ENCOUNTER — OFFICE VISIT (OUTPATIENT)
Dept: OBGYN CLINIC | Facility: CLINIC | Age: 44
End: 2019-10-29
Payer: COMMERCIAL

## 2019-10-29 VITALS
DIASTOLIC BLOOD PRESSURE: 84 MMHG | RESPIRATION RATE: 14 BRPM | HEART RATE: 80 BPM | HEIGHT: 62 IN | BODY MASS INDEX: 28.52 KG/M2 | TEMPERATURE: 98 F | WEIGHT: 155 LBS | SYSTOLIC BLOOD PRESSURE: 122 MMHG

## 2019-10-29 DIAGNOSIS — Z30.431 SURVEILLANCE OF (INTRAUTERINE) CONTRACEPTIVE DEVICE: ICD-10-CM

## 2019-10-29 DIAGNOSIS — Z01.419 WELL WOMAN EXAM WITH ROUTINE GYNECOLOGICAL EXAM: Primary | ICD-10-CM

## 2019-10-29 DIAGNOSIS — Z11.51 SCREENING FOR HUMAN PAPILLOMAVIRUS: ICD-10-CM

## 2019-10-29 DIAGNOSIS — Z12.4 SCREENING FOR MALIGNANT NEOPLASM OF THE CERVIX: ICD-10-CM

## 2019-10-29 DIAGNOSIS — Z12.39 BREAST CANCER SCREENING: ICD-10-CM

## 2019-10-29 PROCEDURE — 88175 CYTOPATH C/V AUTO FLUID REDO: CPT | Performed by: NURSE PRACTITIONER

## 2019-10-29 PROCEDURE — 99386 PREV VISIT NEW AGE 40-64: CPT | Performed by: NURSE PRACTITIONER

## 2019-10-29 PROCEDURE — 87624 HPV HI-RISK TYP POOLED RSLT: CPT | Performed by: NURSE PRACTITIONER

## 2019-10-29 NOTE — PROGRESS NOTES
Pt here for pe/pap.   LMP:Pt has IUD Mirena  Last pap:about 3 years ago  negative  Last mammogram;  Birth control:IUD mirena

## 2019-10-29 NOTE — PROGRESS NOTES
HPI:   Sara Navarro is a 41year old  who presents for an annual gynecological exam. Pt has Mirena IUD she believes is expiring soon and would like to discuss getting it replaced. Menses: No LMP recorded.  (Menstrual status: IUD - Intrauterine Dev 6-8 oz of sangria/wine daily.      Drug use: No       REVIEW OF SYSTEMS:   CONSTITUTIONAL: generally feels well   SKIN: denies any unusual skin lesions, rash, itchiness  HEENT: denies nasal congestion, sinus pain or sore throat; denies blurred vision, visua incorporating 4x weekly cardiovascular activity to maintain good physical health. - We discussed maintaining a healthy diet heavy in fruits and vegetables. - I encouraged the pt to stay away from fast foods and fried foods.   - I encouraged pt to incorpor

## 2020-07-03 ENCOUNTER — APPOINTMENT (OUTPATIENT)
Dept: GENERAL RADIOLOGY | Age: 45
End: 2020-07-03
Attending: EMERGENCY MEDICINE
Payer: COMMERCIAL

## 2020-07-03 ENCOUNTER — HOSPITAL ENCOUNTER (OUTPATIENT)
Age: 45
Discharge: HOME | End: 2020-07-03
Attending: EMERGENCY MEDICINE
Payer: COMMERCIAL

## 2020-07-03 VITALS
DIASTOLIC BLOOD PRESSURE: 84 MMHG | HEART RATE: 83 BPM | BODY MASS INDEX: 26.58 KG/M2 | WEIGHT: 150 LBS | OXYGEN SATURATION: 99 % | TEMPERATURE: 98 F | SYSTOLIC BLOOD PRESSURE: 145 MMHG | RESPIRATION RATE: 18 BRPM | HEIGHT: 63 IN

## 2020-07-03 DIAGNOSIS — S93.602A FOOT SPRAIN, LEFT, INITIAL ENCOUNTER: Primary | ICD-10-CM

## 2020-07-03 PROCEDURE — 99213 OFFICE O/P EST LOW 20 MIN: CPT

## 2020-07-03 PROCEDURE — 73630 X-RAY EXAM OF FOOT: CPT | Performed by: EMERGENCY MEDICINE

## 2020-07-03 NOTE — ED PROVIDER NOTES
Patient Seen in: [facility]      History   Patient presents with:  Knee Pain    Stated Complaint: right knee pain     HPI    Patient is a pleasant 72-year-old female, presenting for evaluation of left foot pain.     Patient state reviewed. Constitutional:       General: She is not in acute distress. HENT:      Head: Normocephalic and atraumatic. Eyes:      Extraocular Movements: Extraocular movements intact.       Conjunctiva/sclera: Conjunctivae normal.   Neck:      Musculoske be taken as needed, as directed. Patient was invited to return for reevaluation of any problems, worsening symptoms, or as discussed. Patient verbalizes understanding and is comfortable with the plan as recommended.   Patient was subsequently discharg

## 2020-08-14 DIAGNOSIS — Z78.9 PARTICIPANT IN HEALTH AND WELLNESS PLAN: Primary | ICD-10-CM

## 2020-08-18 ENCOUNTER — NURSE ONLY (OUTPATIENT)
Dept: LAB | Age: 45
End: 2020-08-18
Attending: PREVENTIVE MEDICINE
Payer: COMMERCIAL

## 2020-11-12 ENCOUNTER — OFFICE VISIT (OUTPATIENT)
Dept: FAMILY MEDICINE CLINIC | Facility: CLINIC | Age: 45
End: 2020-11-12
Payer: COMMERCIAL

## 2020-11-12 VITALS
HEIGHT: 63 IN | WEIGHT: 155 LBS | HEART RATE: 86 BPM | SYSTOLIC BLOOD PRESSURE: 128 MMHG | DIASTOLIC BLOOD PRESSURE: 76 MMHG | RESPIRATION RATE: 16 BRPM | OXYGEN SATURATION: 98 % | TEMPERATURE: 98 F | BODY MASS INDEX: 27.46 KG/M2

## 2020-11-12 DIAGNOSIS — E55.9 VITAMIN D DEFICIENCY: ICD-10-CM

## 2020-11-12 DIAGNOSIS — Z00.00 PHYSICAL EXAM, ANNUAL: Primary | ICD-10-CM

## 2020-11-12 DIAGNOSIS — E53.8 VITAMIN B 12 DEFICIENCY: ICD-10-CM

## 2020-11-12 PROCEDURE — 3008F BODY MASS INDEX DOCD: CPT | Performed by: FAMILY MEDICINE

## 2020-11-12 PROCEDURE — 99072 ADDL SUPL MATRL&STAF TM PHE: CPT | Performed by: FAMILY MEDICINE

## 2020-11-12 PROCEDURE — 3078F DIAST BP <80 MM HG: CPT | Performed by: FAMILY MEDICINE

## 2020-11-12 PROCEDURE — 3074F SYST BP LT 130 MM HG: CPT | Performed by: FAMILY MEDICINE

## 2020-11-12 PROCEDURE — 99396 PREV VISIT EST AGE 40-64: CPT | Performed by: FAMILY MEDICINE

## 2020-11-12 RX ORDER — EPINEPHRINE 0.3 MG/.3ML
0.3 INJECTION SUBCUTANEOUS ONCE
COMMUNITY

## 2020-11-12 NOTE — PROGRESS NOTES
HPI:   Shanda Rankin is a 39year old female who presents for a complete physical exam. Symptoms: denies discharge, itching, burning or dysuria. Patient has no complaints.   She was seen Dr. Emeterio Morgan for breast and pelvic examination recommended to call Nasal route 2 (two) times daily.  1 Bottle 2      Past Medical History:   Diagnosis Date   • Abdominal pain    • Bloating    • Constipation    • Diarrhea, unspecified    • Food intolerance    • Irregular bowel habits    • Weight gain       Past Surgical His (70.3 kg)   SpO2 98%   Breastfeeding No   BMI 27.46 kg/m²   Body mass index is 27.46 kg/m².    GENERAL: well developed, well nourished,in no apparent distress  SKIN: no rashes,no suspicious lesions  HEENT: atraumatic, normocephalic,ears  are clear, throat w

## 2020-11-12 NOTE — PATIENT INSTRUCTIONS
Healthy diet. Stay active. Call [phone] to schedule  fasting blood work or schedule through my chart.

## 2020-11-13 ENCOUNTER — LAB ENCOUNTER (OUTPATIENT)
Dept: LAB | Facility: HOSPITAL | Age: 45
End: 2020-11-13
Attending: FAMILY MEDICINE
Payer: COMMERCIAL

## 2020-11-13 DIAGNOSIS — E55.9 VITAMIN D DEFICIENCY: ICD-10-CM

## 2020-11-13 DIAGNOSIS — Z00.00 PHYSICAL EXAM, ANNUAL: ICD-10-CM

## 2020-11-13 DIAGNOSIS — E53.8 VITAMIN B 12 DEFICIENCY: Primary | ICD-10-CM

## 2020-11-13 DIAGNOSIS — E53.8 VITAMIN B 12 DEFICIENCY: ICD-10-CM

## 2020-11-13 PROCEDURE — 36415 COLL VENOUS BLD VENIPUNCTURE: CPT

## 2020-11-13 PROCEDURE — 84443 ASSAY THYROID STIM HORMONE: CPT

## 2020-11-13 PROCEDURE — 82306 VITAMIN D 25 HYDROXY: CPT

## 2020-11-13 PROCEDURE — 85025 COMPLETE CBC W/AUTO DIFF WBC: CPT

## 2020-11-13 PROCEDURE — 80053 COMPREHEN METABOLIC PANEL: CPT

## 2020-11-13 PROCEDURE — 81003 URINALYSIS AUTO W/O SCOPE: CPT

## 2020-11-13 PROCEDURE — 82607 VITAMIN B-12: CPT

## 2020-11-13 PROCEDURE — 80061 LIPID PANEL: CPT

## 2020-11-13 RX ORDER — ERGOCALCIFEROL 1.25 MG/1
50000 CAPSULE ORAL WEEKLY
Qty: 12 CAPSULE | Refills: 0 | Status: SHIPPED | OUTPATIENT
Start: 2020-11-13 | End: 2020-12-13

## 2020-12-19 ENCOUNTER — IMMUNIZATION (OUTPATIENT)
Dept: LAB | Facility: HOSPITAL | Age: 45
End: 2020-12-19
Attending: PREVENTIVE MEDICINE
Payer: COMMERCIAL

## 2020-12-19 DIAGNOSIS — Z23 NEED FOR VACCINATION: ICD-10-CM

## 2020-12-19 PROCEDURE — 0001A PFIZER-BIONTECH COVID-19 VACCINE: CPT

## 2021-01-09 ENCOUNTER — IMMUNIZATION (OUTPATIENT)
Dept: LAB | Facility: HOSPITAL | Age: 46
End: 2021-01-09
Attending: PREVENTIVE MEDICINE

## 2021-01-09 DIAGNOSIS — Z23 NEED FOR VACCINATION: ICD-10-CM

## 2021-01-09 PROCEDURE — 0002A SARSCOV2 VAC 30MCG/0.3ML IM: CPT

## 2021-02-05 RX ORDER — ERGOCALCIFEROL 1.25 MG/1
CAPSULE ORAL
Qty: 12 CAPSULE | Refills: 0 | OUTPATIENT
Start: 2021-02-05

## 2021-04-27 ENCOUNTER — LAB ENCOUNTER (OUTPATIENT)
Dept: LAB | Facility: HOSPITAL | Age: 46
End: 2021-04-27
Attending: FAMILY MEDICINE
Payer: COMMERCIAL

## 2021-04-27 DIAGNOSIS — E53.8 VITAMIN B 12 DEFICIENCY: ICD-10-CM

## 2021-04-27 DIAGNOSIS — E55.9 VITAMIN D DEFICIENCY: ICD-10-CM

## 2021-04-27 PROCEDURE — 36415 COLL VENOUS BLD VENIPUNCTURE: CPT

## 2021-04-27 PROCEDURE — 82607 VITAMIN B-12: CPT

## 2021-04-27 PROCEDURE — 82306 VITAMIN D 25 HYDROXY: CPT

## 2021-04-29 DIAGNOSIS — E55.9 VITAMIN D DEFICIENCY: Primary | ICD-10-CM

## 2021-07-15 RX ORDER — ERGOCALCIFEROL 1.25 MG/1
CAPSULE ORAL
Qty: 12 CAPSULE | Refills: 0 | OUTPATIENT
Start: 2021-07-15

## 2021-11-15 ENCOUNTER — IMMUNIZATION (OUTPATIENT)
Dept: LAB | Facility: HOSPITAL | Age: 46
End: 2021-11-15
Attending: EMERGENCY MEDICINE
Payer: COMMERCIAL

## 2021-11-15 DIAGNOSIS — Z23 NEED FOR VACCINATION: Primary | ICD-10-CM

## 2021-11-15 PROCEDURE — 0004A SARSCOV2 VAC 30MCG/0.3ML IM: CPT

## 2022-04-20 ENCOUNTER — OFFICE VISIT (OUTPATIENT)
Dept: FAMILY MEDICINE CLINIC | Facility: CLINIC | Age: 47
End: 2022-04-20
Payer: COMMERCIAL

## 2022-04-20 VITALS
BODY MASS INDEX: 28.14 KG/M2 | RESPIRATION RATE: 20 BRPM | SYSTOLIC BLOOD PRESSURE: 116 MMHG | HEIGHT: 63 IN | DIASTOLIC BLOOD PRESSURE: 74 MMHG | WEIGHT: 158.81 LBS | TEMPERATURE: 98 F | HEART RATE: 78 BPM

## 2022-04-20 DIAGNOSIS — E55.9 VITAMIN D DEFICIENCY: ICD-10-CM

## 2022-04-20 DIAGNOSIS — E53.8 VITAMIN B 12 DEFICIENCY: ICD-10-CM

## 2022-04-20 DIAGNOSIS — Z00.00 LABORATORY EXAM ORDERED AS PART OF ROUTINE GENERAL MEDICAL EXAMINATION: ICD-10-CM

## 2022-04-20 DIAGNOSIS — Z00.00 PHYSICAL EXAM, ANNUAL: Primary | ICD-10-CM

## 2022-04-20 DIAGNOSIS — Z13.89 SCREENING FOR GENITOURINARY CONDITION: ICD-10-CM

## 2022-04-20 PROCEDURE — 3074F SYST BP LT 130 MM HG: CPT | Performed by: FAMILY MEDICINE

## 2022-04-20 PROCEDURE — 3078F DIAST BP <80 MM HG: CPT | Performed by: FAMILY MEDICINE

## 2022-04-20 PROCEDURE — 3008F BODY MASS INDEX DOCD: CPT | Performed by: FAMILY MEDICINE

## 2022-04-20 PROCEDURE — 99396 PREV VISIT EST AGE 40-64: CPT | Performed by: FAMILY MEDICINE

## 2022-04-20 RX ORDER — AZELASTINE 1 MG/ML
2 SPRAY, METERED NASAL 2 TIMES DAILY
Qty: 0.1 ML | Refills: 0 | Status: CANCELLED | OUTPATIENT
Start: 2022-04-20

## 2022-04-20 RX ORDER — LINACLOTIDE 145 UG/1
145 CAPSULE, GELATIN COATED ORAL DAILY
Qty: 30 CAPSULE | Refills: 3 | Status: CANCELLED | OUTPATIENT
Start: 2022-04-20

## 2022-04-20 RX ORDER — PREDNISONE 20 MG/1
TABLET ORAL
Qty: 15 TABLET | Refills: 0 | Status: SHIPPED | OUTPATIENT
Start: 2022-04-20

## 2022-04-20 RX ORDER — PREDNISONE 20 MG/1
TABLET ORAL
Qty: 15 TABLET | Refills: 0 | Status: CANCELLED | OUTPATIENT
Start: 2022-04-20

## 2022-04-20 RX ORDER — LEVOCETIRIZINE DIHYDROCHLORIDE 5 MG/1
5 TABLET, FILM COATED ORAL EVERY EVENING
Qty: 30 TABLET | Refills: 0 | Status: CANCELLED | OUTPATIENT
Start: 2022-04-20

## 2022-04-20 RX ORDER — EPINEPHRINE 0.3 MG/.3ML
0.3 INJECTION SUBCUTANEOUS ONCE
Qty: 1 EACH | Refills: 0 | Status: SHIPPED | OUTPATIENT
Start: 2022-04-20 | End: 2022-04-20

## 2022-04-21 ENCOUNTER — LAB ENCOUNTER (OUTPATIENT)
Dept: LAB | Facility: HOSPITAL | Age: 47
End: 2022-04-21
Attending: FAMILY MEDICINE
Payer: COMMERCIAL

## 2022-04-21 DIAGNOSIS — Z13.89 SCREENING FOR GENITOURINARY CONDITION: ICD-10-CM

## 2022-04-21 DIAGNOSIS — E53.8 VITAMIN B 12 DEFICIENCY: ICD-10-CM

## 2022-04-21 DIAGNOSIS — E55.9 VITAMIN D DEFICIENCY: ICD-10-CM

## 2022-04-21 DIAGNOSIS — Z00.00 LABORATORY EXAM ORDERED AS PART OF ROUTINE GENERAL MEDICAL EXAMINATION: ICD-10-CM

## 2022-04-21 LAB
ALBUMIN SERPL-MCNC: 3.9 G/DL (ref 3.4–5)
ALBUMIN/GLOB SERPL: 1.1 {RATIO} (ref 1–2)
ALP LIVER SERPL-CCNC: 72 U/L
ALT SERPL-CCNC: 16 U/L
ANION GAP SERPL CALC-SCNC: 6 MMOL/L (ref 0–18)
AST SERPL-CCNC: 18 U/L (ref 15–37)
BASOPHILS # BLD AUTO: 0.02 X10(3) UL (ref 0–0.2)
BASOPHILS NFR BLD AUTO: 0.3 %
BILIRUB SERPL-MCNC: 0.4 MG/DL (ref 0.1–2)
BILIRUB UR QL STRIP.AUTO: NEGATIVE
BUN BLD-MCNC: 16 MG/DL (ref 7–18)
CALCIUM BLD-MCNC: 8.8 MG/DL (ref 8.5–10.1)
CHLORIDE SERPL-SCNC: 106 MMOL/L (ref 98–112)
CHOLEST SERPL-MCNC: 197 MG/DL (ref ?–200)
CLARITY UR REFRACT.AUTO: CLEAR
CO2 SERPL-SCNC: 24 MMOL/L (ref 21–32)
CREAT BLD-MCNC: 0.92 MG/DL
EOSINOPHIL # BLD AUTO: 0.14 X10(3) UL (ref 0–0.7)
EOSINOPHIL NFR BLD AUTO: 1.8 %
ERYTHROCYTE [DISTWIDTH] IN BLOOD BY AUTOMATED COUNT: 13.8 %
FASTING PATIENT LIPID ANSWER: YES
FASTING STATUS PATIENT QL REPORTED: YES
GLOBULIN PLAS-MCNC: 3.5 G/DL (ref 2.8–4.4)
GLUCOSE BLD-MCNC: 84 MG/DL (ref 70–99)
GLUCOSE UR STRIP.AUTO-MCNC: NEGATIVE MG/DL
HCT VFR BLD AUTO: 42.1 %
HDLC SERPL-MCNC: 78 MG/DL (ref 40–59)
HGB BLD-MCNC: 13.3 G/DL
IMM GRANULOCYTES # BLD AUTO: 0.02 X10(3) UL (ref 0–1)
IMM GRANULOCYTES NFR BLD: 0.3 %
LDLC SERPL CALC-MCNC: 97 MG/DL (ref ?–100)
LEUKOCYTE ESTERASE UR QL STRIP.AUTO: NEGATIVE
LYMPHOCYTES # BLD AUTO: 3.04 X10(3) UL (ref 1–4)
LYMPHOCYTES NFR BLD AUTO: 39.5 %
MCH RBC QN AUTO: 29.8 PG (ref 26–34)
MCHC RBC AUTO-ENTMCNC: 31.6 G/DL (ref 31–37)
MCV RBC AUTO: 94.4 FL
MONOCYTES # BLD AUTO: 0.52 X10(3) UL (ref 0.1–1)
MONOCYTES NFR BLD AUTO: 6.8 %
NEUTROPHILS # BLD AUTO: 3.96 X10 (3) UL (ref 1.5–7.7)
NEUTROPHILS # BLD AUTO: 3.96 X10(3) UL (ref 1.5–7.7)
NEUTROPHILS NFR BLD AUTO: 51.3 %
NITRITE UR QL STRIP.AUTO: NEGATIVE
NONHDLC SERPL-MCNC: 119 MG/DL (ref ?–130)
OSMOLALITY SERPL CALC.SUM OF ELEC: 282 MOSM/KG (ref 275–295)
PH UR STRIP.AUTO: 5 [PH] (ref 5–8)
PLATELET # BLD AUTO: 214 10(3)UL (ref 150–450)
POTASSIUM SERPL-SCNC: 4.4 MMOL/L (ref 3.5–5.1)
PROT SERPL-MCNC: 7.4 G/DL (ref 6.4–8.2)
PROT UR STRIP.AUTO-MCNC: NEGATIVE MG/DL
RBC # BLD AUTO: 4.46 X10(6)UL
SODIUM SERPL-SCNC: 136 MMOL/L (ref 136–145)
SP GR UR STRIP.AUTO: 1.01 (ref 1–1.03)
TRIGL SERPL-MCNC: 125 MG/DL (ref 30–149)
TSI SER-ACNC: 1.35 MIU/ML (ref 0.36–3.74)
UROBILINOGEN UR STRIP.AUTO-MCNC: <2 MG/DL
VIT B12 SERPL-MCNC: 372 PG/ML (ref 193–986)
VIT D+METAB SERPL-MCNC: 15.1 NG/ML (ref 30–100)
VLDLC SERPL CALC-MCNC: 21 MG/DL (ref 0–30)
WBC # BLD AUTO: 7.7 X10(3) UL (ref 4–11)

## 2022-04-21 PROCEDURE — 81001 URINALYSIS AUTO W/SCOPE: CPT

## 2022-04-21 PROCEDURE — 82306 VITAMIN D 25 HYDROXY: CPT

## 2022-04-21 PROCEDURE — 80053 COMPREHEN METABOLIC PANEL: CPT

## 2022-04-21 PROCEDURE — 36415 COLL VENOUS BLD VENIPUNCTURE: CPT

## 2022-04-21 PROCEDURE — 84443 ASSAY THYROID STIM HORMONE: CPT

## 2022-04-21 PROCEDURE — 80061 LIPID PANEL: CPT

## 2022-04-21 PROCEDURE — 82607 VITAMIN B-12: CPT

## 2022-04-21 PROCEDURE — 85025 COMPLETE CBC W/AUTO DIFF WBC: CPT

## 2022-04-21 RX ORDER — ERGOCALCIFEROL 1.25 MG/1
50000 CAPSULE ORAL WEEKLY
Qty: 12 CAPSULE | Refills: 0 | Status: SHIPPED | OUTPATIENT
Start: 2022-04-21 | End: 2022-07-20

## 2022-06-02 ENCOUNTER — MED REC SCAN ONLY (OUTPATIENT)
Dept: FAMILY MEDICINE CLINIC | Facility: CLINIC | Age: 47
End: 2022-06-02

## 2022-06-28 ENCOUNTER — MED REC SCAN ONLY (OUTPATIENT)
Dept: FAMILY MEDICINE CLINIC | Facility: CLINIC | Age: 47
End: 2022-06-28

## 2022-07-11 RX ORDER — ERGOCALCIFEROL 1.25 MG/1
CAPSULE ORAL
Qty: 12 CAPSULE | Refills: 0 | OUTPATIENT
Start: 2022-07-11

## 2022-07-11 NOTE — TELEPHONE ENCOUNTER
LOV: 04/20/2022  Last Refill:   ergocalciferol 1.25 MG (43765 UT) Oral Cap 12 capsule 0 4/21/2022 7/20/2022     RTC: 04/2023  Protocol: N/A

## 2022-08-04 DIAGNOSIS — M72.2 PLANTAR FASCIITIS OF LEFT FOOT: ICD-10-CM

## 2022-08-04 DIAGNOSIS — M76.822 POSTERIOR TIBIAL TENDON DYSFUNCTION, LEFT: Primary | ICD-10-CM

## 2022-08-10 ENCOUNTER — MED REC SCAN ONLY (OUTPATIENT)
Dept: FAMILY MEDICINE CLINIC | Facility: CLINIC | Age: 47
End: 2022-08-10

## 2022-08-11 ENCOUNTER — HOSPITAL ENCOUNTER (OUTPATIENT)
Dept: MRI IMAGING | Age: 47
Discharge: HOME | End: 2022-08-11
Attending: PODIATRIST
Payer: COMMERCIAL

## 2022-08-11 DIAGNOSIS — M72.2 PLANTAR FASCIITIS OF LEFT FOOT: ICD-10-CM

## 2022-08-11 DIAGNOSIS — M76.822 POSTERIOR TIBIAL TENDON DYSFUNCTION, LEFT: ICD-10-CM

## 2022-08-11 PROCEDURE — 73718 MRI LOWER EXTREMITY W/O DYE: CPT | Performed by: PODIATRIST

## 2022-08-11 PROCEDURE — 73721 MRI JNT OF LWR EXTRE W/O DYE: CPT | Performed by: PODIATRIST

## 2022-09-06 ENCOUNTER — OFFICE VISIT (OUTPATIENT)
Dept: OBGYN CLINIC | Facility: CLINIC | Age: 47
End: 2022-09-06
Payer: COMMERCIAL

## 2022-09-06 VITALS
HEART RATE: 67 BPM | SYSTOLIC BLOOD PRESSURE: 146 MMHG | BODY MASS INDEX: 28 KG/M2 | DIASTOLIC BLOOD PRESSURE: 82 MMHG | WEIGHT: 156 LBS

## 2022-09-06 DIAGNOSIS — Z97.5 IUD (INTRAUTERINE DEVICE) IN PLACE: ICD-10-CM

## 2022-09-06 DIAGNOSIS — Z12.31 VISIT FOR SCREENING MAMMOGRAM: ICD-10-CM

## 2022-09-06 DIAGNOSIS — T83.32XA INTRAUTERINE CONTRACEPTIVE DEVICE THREADS LOST, INITIAL ENCOUNTER: ICD-10-CM

## 2022-09-06 DIAGNOSIS — Z01.419 ENCOUNTER FOR GYNECOLOGICAL EXAMINATION WITHOUT ABNORMAL FINDING: Primary | ICD-10-CM

## 2022-09-06 PROCEDURE — 99202 OFFICE O/P NEW SF 15 MIN: CPT | Performed by: OBSTETRICS & GYNECOLOGY

## 2022-09-06 PROCEDURE — 3079F DIAST BP 80-89 MM HG: CPT | Performed by: OBSTETRICS & GYNECOLOGY

## 2022-09-06 PROCEDURE — 3077F SYST BP >= 140 MM HG: CPT | Performed by: OBSTETRICS & GYNECOLOGY

## 2022-09-06 PROCEDURE — 99386 PREV VISIT NEW AGE 40-64: CPT | Performed by: OBSTETRICS & GYNECOLOGY

## 2022-09-06 RX ORDER — EPINEPHRINE 0.3 MG/.3ML
0.3 INJECTION SUBCUTANEOUS ONCE
COMMUNITY
Start: 2022-04-20

## 2022-09-06 RX ORDER — MELOXICAM 7.5 MG/1
TABLET ORAL
COMMUNITY
Start: 2022-08-04

## 2022-09-15 ENCOUNTER — HOSPITAL ENCOUNTER (OUTPATIENT)
Dept: MAMMOGRAPHY | Facility: HOSPITAL | Age: 47
Discharge: HOME | End: 2022-09-15
Attending: OBSTETRICS & GYNECOLOGY
Payer: COMMERCIAL

## 2022-09-15 DIAGNOSIS — Z12.31 VISIT FOR SCREENING MAMMOGRAM: ICD-10-CM

## 2022-09-15 PROCEDURE — 77067 SCR MAMMO BI INCL CAD: CPT | Performed by: OBSTETRICS & GYNECOLOGY

## 2022-09-15 PROCEDURE — 77063 BREAST TOMOSYNTHESIS BI: CPT | Performed by: OBSTETRICS & GYNECOLOGY

## 2022-09-28 ENCOUNTER — HOSPITAL ENCOUNTER (OUTPATIENT)
Dept: ULTRASOUND IMAGING | Age: 47
Discharge: HOME | End: 2022-09-28
Attending: OBSTETRICS & GYNECOLOGY
Payer: COMMERCIAL

## 2022-09-28 DIAGNOSIS — T83.32XA INTRAUTERINE CONTRACEPTIVE DEVICE THREADS LOST, INITIAL ENCOUNTER: ICD-10-CM

## 2022-09-28 PROCEDURE — 76830 TRANSVAGINAL US NON-OB: CPT | Performed by: OBSTETRICS & GYNECOLOGY

## 2022-09-28 PROCEDURE — 76856 US EXAM PELVIC COMPLETE: CPT | Performed by: OBSTETRICS & GYNECOLOGY

## 2022-10-11 ENCOUNTER — OFFICE VISIT (OUTPATIENT)
Dept: FAMILY MEDICINE CLINIC | Facility: CLINIC | Age: 47
End: 2022-10-11
Payer: COMMERCIAL

## 2022-10-11 DIAGNOSIS — H60.501 ACUTE OTITIS EXTERNA OF RIGHT EAR, UNSPECIFIED TYPE: Primary | ICD-10-CM

## 2022-10-11 PROCEDURE — 99213 OFFICE O/P EST LOW 20 MIN: CPT | Performed by: NURSE PRACTITIONER

## 2022-10-11 RX ORDER — CIPROFLOXACIN AND DEXAMETHASONE 3; 1 MG/ML; MG/ML
4 SUSPENSION/ DROPS AURICULAR (OTIC) 2 TIMES DAILY
Qty: 1 EACH | Refills: 1 | Status: SHIPPED | OUTPATIENT
Start: 2022-10-11 | End: 2022-10-18

## 2022-10-18 ENCOUNTER — TELEPHONE (OUTPATIENT)
Dept: OBGYN CLINIC | Facility: CLINIC | Age: 47
End: 2022-10-18

## 2022-10-18 DIAGNOSIS — T83.32XA INTRAUTERINE CONTRACEPTIVE DEVICE THREADS LOST, INITIAL ENCOUNTER: Primary | ICD-10-CM

## 2022-10-18 NOTE — TELEPHONE ENCOUNTER
Discussed with pt CAP recs for endosee for lost IUD strings, pt states understanding. Pt indicates she does not get a cycle, states last one was a year ago. Pt informed of need for qual HCG the day before and Ibuprofen prep an hour before. Pt states understanding. Pt accepts appt 11/8.

## 2022-10-18 NOTE — TELEPHONE ENCOUNTER
OB GYN SURGICAL SCHEDULING    Assessment: lost IUD strings    Pre-Operative Procedure:  Endosee, removal of IUD    In / on: day 7-10 of cycle    Admission:  office procedure    Anesthesia: motrin 30 min before procedure    Additional Orders:  Routine Orders    Comments / Orders to Nurse:  If she does not have menses due to the mirena then procedure can be done with a neg  preg test the day before    Discussed possible complications including but not limited to:  bleeding, infection, perforation of uterus and surgery may not improve symptoms

## 2022-10-24 ENCOUNTER — IMMUNIZATION (OUTPATIENT)
Dept: LAB | Facility: HOSPITAL | Age: 47
End: 2022-10-24
Attending: PREVENTIVE MEDICINE
Payer: COMMERCIAL

## 2022-10-24 DIAGNOSIS — Z23 NEED FOR VACCINATION: Primary | ICD-10-CM

## 2022-10-24 PROCEDURE — 90471 IMMUNIZATION ADMIN: CPT

## 2022-11-07 ENCOUNTER — LAB ENCOUNTER (OUTPATIENT)
Dept: LAB | Facility: HOSPITAL | Age: 47
End: 2022-11-07
Attending: OBSTETRICS & GYNECOLOGY
Payer: COMMERCIAL

## 2022-11-07 DIAGNOSIS — E55.9 VITAMIN D DEFICIENCY: ICD-10-CM

## 2022-11-07 DIAGNOSIS — T83.32XA INTRAUTERINE CONTRACEPTIVE DEVICE THREADS LOST, INITIAL ENCOUNTER: ICD-10-CM

## 2022-11-07 LAB
HCG SERPL QL: NEGATIVE
VIT D+METAB SERPL-MCNC: 17.9 NG/ML (ref 30–100)

## 2022-11-07 PROCEDURE — 84703 CHORIONIC GONADOTROPIN ASSAY: CPT

## 2022-11-07 PROCEDURE — 82306 VITAMIN D 25 HYDROXY: CPT

## 2022-11-07 PROCEDURE — 36415 COLL VENOUS BLD VENIPUNCTURE: CPT

## 2022-11-08 ENCOUNTER — OFFICE VISIT (OUTPATIENT)
Dept: OBGYN CLINIC | Facility: CLINIC | Age: 47
End: 2022-11-08
Payer: COMMERCIAL

## 2022-11-08 VITALS
BODY MASS INDEX: 29 KG/M2 | DIASTOLIC BLOOD PRESSURE: 85 MMHG | HEART RATE: 83 BPM | SYSTOLIC BLOOD PRESSURE: 126 MMHG | WEIGHT: 161.19 LBS

## 2022-11-08 DIAGNOSIS — Z30.430 ENCOUNTER FOR INSERTION OF INTRAUTERINE CONTRACEPTIVE DEVICE (IUD): ICD-10-CM

## 2022-11-08 DIAGNOSIS — T83.32XA INTRAUTERINE CONTRACEPTIVE DEVICE THREADS LOST, INITIAL ENCOUNTER: Primary | ICD-10-CM

## 2022-11-08 DIAGNOSIS — E55.9 VITAMIN D DEFICIENCY: Primary | ICD-10-CM

## 2022-11-08 PROCEDURE — 3079F DIAST BP 80-89 MM HG: CPT | Performed by: OBSTETRICS & GYNECOLOGY

## 2022-11-08 PROCEDURE — 58562 HYSTEROSCOPY REMOVE FB: CPT | Performed by: OBSTETRICS & GYNECOLOGY

## 2022-11-08 PROCEDURE — 58300 INSERT INTRAUTERINE DEVICE: CPT | Performed by: OBSTETRICS & GYNECOLOGY

## 2022-11-08 PROCEDURE — 3074F SYST BP LT 130 MM HG: CPT | Performed by: OBSTETRICS & GYNECOLOGY

## 2022-11-08 RX ORDER — ERGOCALCIFEROL (VITAMIN D2) 1250 MCG
50000 CAPSULE ORAL WEEKLY
Qty: 12 CAPSULE | Refills: 0 | Status: SHIPPED | OUTPATIENT
Start: 2022-11-08 | End: 2023-01-25

## 2022-11-08 NOTE — PROCEDURES
Endosee Procedure     LMP: no menses due to mirena   Pregnancy Results: n/a  Birth control method(s) used: mirena    Consent signed. Procedure discussed with the patient in detail including indication, risks, benefits, alternatives and complications. Indication:  lost IUD strings    Procedure:   Yes, operative hysteroscopy with removal of foreign body    Speculum placed in the vagina. Betadine wash of vagina and cervix performed. Single tooth tenaculum was placed at the 12 o'clock position. Endocervical dilator placed within cervical canal until slight resistance bypassed and left in place. Dilator exchanged for Endosee catheter. Camera attached. Assistant injected 1 cc / sec slowly allowing visualization of endometrial cavity. Fallopian tubes os seen bilaterally. Findings: IUD with strings in cavity   by threading grasper along endosee camera and mirena IUD grasped and removed. .  Single tooth tenaculum removed. Good hemostasis noted. Patient tolerated procedure well. Visit Plan:  See separate note for mirena replacement, pt advised that her insurance may not cover both procedures but she asked that I proceed.

## 2022-11-08 NOTE — PROCEDURES
IUD Insertion     Pregnancy Results: negative from n/a test   Birth control method(s) used:  mirena    Consent signed. Procedure discussed with the patient in detail including indication, risks, benefits, alternatives and complications. Pelvic Exam Findings:      Procedure:  Speculum placed in the vagina. Betadine wash of vagina and cervix. Single tooth tenaculum was placed at the 12 o'clock position. Uterus sounded to 9 cm. Mirena IUD was placed without difficulty. Strings cut at 3 cm. Single tooth tenaculum removed. Good hemostasis noted. Patient tolerated procedure well. Visit Plan:  IUD surveillance was discussed with the patient.

## 2023-03-06 ENCOUNTER — OFFICE VISIT (OUTPATIENT)
Dept: FAMILY MEDICINE CLINIC | Facility: CLINIC | Age: 48
End: 2023-03-06
Payer: COMMERCIAL

## 2023-03-06 VITALS
DIASTOLIC BLOOD PRESSURE: 68 MMHG | SYSTOLIC BLOOD PRESSURE: 126 MMHG | BODY MASS INDEX: 29.41 KG/M2 | WEIGHT: 166 LBS | HEIGHT: 63 IN | RESPIRATION RATE: 16 BRPM | HEART RATE: 84 BPM | TEMPERATURE: 98 F

## 2023-03-06 DIAGNOSIS — G47.9 SLEEP DIFFICULTIES: Primary | ICD-10-CM

## 2023-03-06 DIAGNOSIS — E55.9 VITAMIN D DEFICIENCY: ICD-10-CM

## 2023-03-06 PROCEDURE — 3078F DIAST BP <80 MM HG: CPT | Performed by: FAMILY MEDICINE

## 2023-03-06 PROCEDURE — 3074F SYST BP LT 130 MM HG: CPT | Performed by: FAMILY MEDICINE

## 2023-03-06 PROCEDURE — 3008F BODY MASS INDEX DOCD: CPT | Performed by: FAMILY MEDICINE

## 2023-03-06 PROCEDURE — 99213 OFFICE O/P EST LOW 20 MIN: CPT | Performed by: FAMILY MEDICINE

## 2023-03-06 RX ORDER — ZOLPIDEM TARTRATE 6.25 MG/1
6.25 TABLET, FILM COATED, EXTENDED RELEASE ORAL NIGHTLY PRN
Qty: 30 TABLET | Refills: 0 | Status: SHIPPED | OUTPATIENT
Start: 2023-03-06

## 2023-03-06 NOTE — PATIENT INSTRUCTIONS
Take zolpidem 6.25 mg at bedtime to use it for 2 weeks then he can try to stop and see if you would be able to sleep on your own. Decrease vitamin D3 to 1000 units daily over-the-counter. Recheck vitamin D level in July/August of this year.

## 2023-03-11 ENCOUNTER — TELEPHONE (OUTPATIENT)
Dept: FAMILY MEDICINE CLINIC | Facility: CLINIC | Age: 48
End: 2023-03-11

## 2023-03-11 DIAGNOSIS — E55.9 VITAMIN D DEFICIENCY: Primary | ICD-10-CM

## 2023-05-02 ENCOUNTER — LAB ENCOUNTER (OUTPATIENT)
Dept: LAB | Age: 48
End: 2023-05-02
Attending: FAMILY MEDICINE
Payer: COMMERCIAL

## 2023-05-02 ENCOUNTER — HOSPITAL ENCOUNTER (OUTPATIENT)
Dept: GENERAL RADIOLOGY | Age: 48
Discharge: HOME | End: 2023-05-02
Attending: FAMILY MEDICINE
Payer: COMMERCIAL

## 2023-05-02 ENCOUNTER — OFFICE VISIT (OUTPATIENT)
Dept: FAMILY MEDICINE CLINIC | Facility: CLINIC | Age: 48
End: 2023-05-02
Payer: COMMERCIAL

## 2023-05-02 VITALS
BODY MASS INDEX: 29.77 KG/M2 | DIASTOLIC BLOOD PRESSURE: 78 MMHG | HEIGHT: 63 IN | WEIGHT: 168 LBS | TEMPERATURE: 98 F | HEART RATE: 86 BPM | SYSTOLIC BLOOD PRESSURE: 122 MMHG | RESPIRATION RATE: 18 BRPM

## 2023-05-02 DIAGNOSIS — R19.7 DIARRHEA, UNSPECIFIED TYPE: Primary | ICD-10-CM

## 2023-05-02 DIAGNOSIS — M25.561 ACUTE PAIN OF BOTH KNEES: ICD-10-CM

## 2023-05-02 DIAGNOSIS — M25.562 ACUTE PAIN OF BOTH KNEES: ICD-10-CM

## 2023-05-02 DIAGNOSIS — M25.552 BILATERAL HIP PAIN: ICD-10-CM

## 2023-05-02 DIAGNOSIS — E55.9 VITAMIN D DEFICIENCY: ICD-10-CM

## 2023-05-02 DIAGNOSIS — R19.7 DIARRHEA, UNSPECIFIED TYPE: ICD-10-CM

## 2023-05-02 DIAGNOSIS — M25.551 BILATERAL HIP PAIN: ICD-10-CM

## 2023-05-02 DIAGNOSIS — K59.04 FUNCTIONAL CONSTIPATION: ICD-10-CM

## 2023-05-02 LAB
ALBUMIN SERPL-MCNC: 3.6 G/DL (ref 3.4–5)
ALBUMIN/GLOB SERPL: 0.9 {RATIO} (ref 1–2)
ALP LIVER SERPL-CCNC: 62 U/L
ALT SERPL-CCNC: 20 U/L
ANION GAP SERPL CALC-SCNC: 2 MMOL/L (ref 0–18)
AST SERPL-CCNC: 19 U/L (ref 15–37)
BASOPHILS # BLD AUTO: 0.04 X10(3) UL (ref 0–0.2)
BASOPHILS NFR BLD AUTO: 0.6 %
BILIRUB SERPL-MCNC: 0.3 MG/DL (ref 0.1–2)
BUN BLD-MCNC: 12 MG/DL (ref 7–18)
CALCIUM BLD-MCNC: 8.7 MG/DL (ref 8.5–10.1)
CHLORIDE SERPL-SCNC: 109 MMOL/L (ref 98–112)
CO2 SERPL-SCNC: 27 MMOL/L (ref 21–32)
CREAT BLD-MCNC: 0.92 MG/DL
EOSINOPHIL # BLD AUTO: 0.11 X10(3) UL (ref 0–0.7)
EOSINOPHIL NFR BLD AUTO: 1.6 %
ERYTHROCYTE [DISTWIDTH] IN BLOOD BY AUTOMATED COUNT: 14 %
FASTING STATUS PATIENT QL REPORTED: YES
GFR SERPLBLD BASED ON 1.73 SQ M-ARVRAT: 78 ML/MIN/1.73M2 (ref 60–?)
GLOBULIN PLAS-MCNC: 3.8 G/DL (ref 2.8–4.4)
GLUCOSE BLD-MCNC: 98 MG/DL (ref 70–99)
HCT VFR BLD AUTO: 42.5 %
HGB BLD-MCNC: 13.4 G/DL
IMM GRANULOCYTES # BLD AUTO: 0.01 X10(3) UL (ref 0–1)
IMM GRANULOCYTES NFR BLD: 0.1 %
LIPASE SERPL-CCNC: 39 U/L (ref 13–75)
LYMPHOCYTES # BLD AUTO: 2.63 X10(3) UL (ref 1–4)
LYMPHOCYTES NFR BLD AUTO: 37.7 %
MCH RBC QN AUTO: 30.6 PG (ref 26–34)
MCHC RBC AUTO-ENTMCNC: 31.5 G/DL (ref 31–37)
MCV RBC AUTO: 97 FL
MONOCYTES # BLD AUTO: 0.53 X10(3) UL (ref 0.1–1)
MONOCYTES NFR BLD AUTO: 7.6 %
NEUTROPHILS # BLD AUTO: 3.66 X10 (3) UL (ref 1.5–7.7)
NEUTROPHILS # BLD AUTO: 3.66 X10(3) UL (ref 1.5–7.7)
NEUTROPHILS NFR BLD AUTO: 52.4 %
OSMOLALITY SERPL CALC.SUM OF ELEC: 286 MOSM/KG (ref 275–295)
PLATELET # BLD AUTO: 235 10(3)UL (ref 150–450)
POTASSIUM SERPL-SCNC: 4.2 MMOL/L (ref 3.5–5.1)
PROT SERPL-MCNC: 7.4 G/DL (ref 6.4–8.2)
RBC # BLD AUTO: 4.38 X10(6)UL
SODIUM SERPL-SCNC: 138 MMOL/L (ref 136–145)
VIT D+METAB SERPL-MCNC: 29.2 NG/ML (ref 30–100)
WBC # BLD AUTO: 7 X10(3) UL (ref 4–11)

## 2023-05-02 PROCEDURE — 85025 COMPLETE CBC W/AUTO DIFF WBC: CPT

## 2023-05-02 PROCEDURE — 73564 X-RAY EXAM KNEE 4 OR MORE: CPT | Performed by: FAMILY MEDICINE

## 2023-05-02 PROCEDURE — 80053 COMPREHEN METABOLIC PANEL: CPT

## 2023-05-02 PROCEDURE — 3078F DIAST BP <80 MM HG: CPT | Performed by: FAMILY MEDICINE

## 2023-05-02 PROCEDURE — 83690 ASSAY OF LIPASE: CPT

## 2023-05-02 PROCEDURE — 3074F SYST BP LT 130 MM HG: CPT | Performed by: FAMILY MEDICINE

## 2023-05-02 PROCEDURE — 99214 OFFICE O/P EST MOD 30 MIN: CPT | Performed by: FAMILY MEDICINE

## 2023-05-02 PROCEDURE — 73523 X-RAY EXAM HIPS BI 5/> VIEWS: CPT | Performed by: FAMILY MEDICINE

## 2023-05-02 PROCEDURE — 82306 VITAMIN D 25 HYDROXY: CPT

## 2023-05-02 PROCEDURE — 3008F BODY MASS INDEX DOCD: CPT | Performed by: FAMILY MEDICINE

## 2023-05-02 RX ORDER — DICLOFENAC SODIUM 75 MG/1
75 TABLET, DELAYED RELEASE ORAL 2 TIMES DAILY
Qty: 60 TABLET | Refills: 0 | Status: SHIPPED | OUTPATIENT
Start: 2023-05-02

## 2023-05-02 NOTE — PATIENT INSTRUCTIONS
Do blood work and stool test.  Try Imodium over-the-counter as needed and probiotic. Keep good hydration. Monitor symptoms. Do x-rays of your knees and hips. Start diclofenac 75 mg 1 tablet twice a day with food. Schedule physical therapy. Call with update after 2 weeks of PT.     If there is no improvement in your symptoms we will consider orthopedic referral.

## 2023-05-03 DIAGNOSIS — E55.9 VITAMIN D DEFICIENCY: Primary | ICD-10-CM

## 2023-05-11 ENCOUNTER — LAB ENCOUNTER (OUTPATIENT)
Dept: LAB | Facility: HOSPITAL | Age: 48
End: 2023-05-11
Attending: FAMILY MEDICINE
Payer: COMMERCIAL

## 2023-05-11 DIAGNOSIS — R19.7 DIARRHEA, UNSPECIFIED TYPE: ICD-10-CM

## 2023-05-11 LAB — C DIFF TOX B STL QL: NEGATIVE

## 2023-05-11 PROCEDURE — 87046 STOOL CULTR AEROBIC BACT EA: CPT

## 2023-05-11 PROCEDURE — 87177 OVA AND PARASITES SMEARS: CPT

## 2023-05-11 PROCEDURE — 87493 C DIFF AMPLIFIED PROBE: CPT

## 2023-05-11 PROCEDURE — 87209 SMEAR COMPLEX STAIN: CPT

## 2023-05-13 ENCOUNTER — HOSPITAL ENCOUNTER (OUTPATIENT)
Facility: HOSPITAL | Age: 48
Setting detail: OBSERVATION
Discharge: HOME | End: 2023-05-13
Attending: EMERGENCY MEDICINE | Admitting: STUDENT IN AN ORGANIZED HEALTH CARE EDUCATION/TRAINING PROGRAM
Payer: COMMERCIAL

## 2023-05-13 ENCOUNTER — ANESTHESIA EVENT (OUTPATIENT)
Dept: SURGERY | Facility: HOSPITAL | Age: 48
End: 2023-05-13
Payer: COMMERCIAL

## 2023-05-13 ENCOUNTER — APPOINTMENT (OUTPATIENT)
Dept: CT IMAGING | Age: 48
End: 2023-05-13
Attending: EMERGENCY MEDICINE
Payer: COMMERCIAL

## 2023-05-13 ENCOUNTER — ANESTHESIA (OUTPATIENT)
Dept: SURGERY | Facility: HOSPITAL | Age: 48
End: 2023-05-13
Payer: COMMERCIAL

## 2023-05-13 VITALS
HEIGHT: 62 IN | OXYGEN SATURATION: 96 % | TEMPERATURE: 98 F | DIASTOLIC BLOOD PRESSURE: 72 MMHG | SYSTOLIC BLOOD PRESSURE: 115 MMHG | WEIGHT: 170 LBS | RESPIRATION RATE: 16 BRPM | BODY MASS INDEX: 31.28 KG/M2 | HEART RATE: 72 BPM

## 2023-05-13 DIAGNOSIS — K35.80 ACUTE APPENDICITIS: ICD-10-CM

## 2023-05-13 DIAGNOSIS — K35.80 ACUTE APPENDICITIS, UNSPECIFIED ACUTE APPENDICITIS TYPE: Primary | ICD-10-CM

## 2023-05-13 LAB
ALBUMIN SERPL-MCNC: 3.7 G/DL (ref 3.4–5)
ALBUMIN/GLOB SERPL: 0.9 {RATIO} (ref 1–2)
ALP LIVER SERPL-CCNC: 69 U/L
ALT SERPL-CCNC: 21 U/L
ANION GAP SERPL CALC-SCNC: 4 MMOL/L (ref 0–18)
AST SERPL-CCNC: 15 U/L (ref 15–37)
ATRIAL RATE: 97 BPM
B-HCG UR QL: NEGATIVE
BASOPHILS # BLD AUTO: 0.02 X10(3) UL (ref 0–0.2)
BASOPHILS NFR BLD AUTO: 0.2 %
BILIRUB SERPL-MCNC: 0.2 MG/DL (ref 0.1–2)
BILIRUB UR QL STRIP.AUTO: NEGATIVE
BUN BLD-MCNC: 13 MG/DL (ref 7–18)
CALCIUM BLD-MCNC: 8.6 MG/DL (ref 8.5–10.1)
CHLORIDE SERPL-SCNC: 106 MMOL/L (ref 98–112)
CLARITY UR REFRACT.AUTO: CLEAR
CO2 SERPL-SCNC: 25 MMOL/L (ref 21–32)
COLOR UR AUTO: YELLOW
CREAT BLD-MCNC: 0.97 MG/DL
EOSINOPHIL # BLD AUTO: 0.04 X10(3) UL (ref 0–0.7)
EOSINOPHIL NFR BLD AUTO: 0.3 %
ERYTHROCYTE [DISTWIDTH] IN BLOOD BY AUTOMATED COUNT: 13.7 %
GFR SERPLBLD BASED ON 1.73 SQ M-ARVRAT: 73 ML/MIN/1.73M2 (ref 60–?)
GLOBULIN PLAS-MCNC: 4.1 G/DL (ref 2.8–4.4)
GLUCOSE BLD-MCNC: 116 MG/DL (ref 70–99)
GLUCOSE UR STRIP.AUTO-MCNC: NEGATIVE MG/DL
HCT VFR BLD AUTO: 39.2 %
HGB BLD-MCNC: 12.9 G/DL
IMM GRANULOCYTES # BLD AUTO: 0.04 X10(3) UL (ref 0–1)
IMM GRANULOCYTES NFR BLD: 0.3 %
KETONES UR STRIP.AUTO-MCNC: NEGATIVE MG/DL
LEUKOCYTE ESTERASE UR QL STRIP.AUTO: NEGATIVE
LIPASE SERPL-CCNC: 41 U/L (ref 13–75)
LYMPHOCYTES # BLD AUTO: 2.23 X10(3) UL (ref 1–4)
LYMPHOCYTES NFR BLD AUTO: 17.2 %
MCH RBC QN AUTO: 30.7 PG (ref 26–34)
MCHC RBC AUTO-ENTMCNC: 32.9 G/DL (ref 31–37)
MCV RBC AUTO: 93.3 FL
MONOCYTES # BLD AUTO: 0.75 X10(3) UL (ref 0.1–1)
MONOCYTES NFR BLD AUTO: 5.8 %
NEUTROPHILS # BLD AUTO: 9.91 X10 (3) UL (ref 1.5–7.7)
NEUTROPHILS # BLD AUTO: 9.91 X10(3) UL (ref 1.5–7.7)
NEUTROPHILS NFR BLD AUTO: 76.2 %
NITRITE UR QL STRIP.AUTO: NEGATIVE
OSMOLALITY SERPL CALC.SUM OF ELEC: 281 MOSM/KG (ref 275–295)
P AXIS: 51 DEGREES
P-R INTERVAL: 156 MS
PH UR STRIP.AUTO: 7 [PH] (ref 5–8)
PLATELET # BLD AUTO: 246 10(3)UL (ref 150–450)
POTASSIUM SERPL-SCNC: 4 MMOL/L (ref 3.5–5.1)
PROT SERPL-MCNC: 7.8 G/DL (ref 6.4–8.2)
PROT UR STRIP.AUTO-MCNC: NEGATIVE MG/DL
Q-T INTERVAL: 348 MS
QRS DURATION: 68 MS
QTC CALCULATION (BEZET): 441 MS
R AXIS: 24 DEGREES
RBC # BLD AUTO: 4.2 X10(6)UL
RBC UR QL AUTO: NEGATIVE
SARS-COV-2 RNA RESP QL NAA+PROBE: NOT DETECTED
SODIUM SERPL-SCNC: 135 MMOL/L (ref 136–145)
SP GR UR STRIP.AUTO: 1.02 (ref 1–1.03)
T AXIS: 9 DEGREES
UROBILINOGEN UR STRIP.AUTO-MCNC: 2 MG/DL
VENTRICULAR RATE: 97 BPM
WBC # BLD AUTO: 13 X10(3) UL (ref 4–11)

## 2023-05-13 PROCEDURE — 44970 LAPAROSCOPY APPENDECTOMY: CPT | Performed by: STUDENT IN AN ORGANIZED HEALTH CARE EDUCATION/TRAINING PROGRAM

## 2023-05-13 PROCEDURE — 99202 OFFICE O/P NEW SF 15 MIN: CPT | Performed by: STUDENT IN AN ORGANIZED HEALTH CARE EDUCATION/TRAINING PROGRAM

## 2023-05-13 PROCEDURE — 0DTJ4ZZ RESECTION OF APPENDIX, PERCUTANEOUS ENDOSCOPIC APPROACH: ICD-10-PCS | Performed by: STUDENT IN AN ORGANIZED HEALTH CARE EDUCATION/TRAINING PROGRAM

## 2023-05-13 PROCEDURE — 74176 CT ABD & PELVIS W/O CONTRAST: CPT | Performed by: EMERGENCY MEDICINE

## 2023-05-13 RX ORDER — HYDROMORPHONE HYDROCHLORIDE 1 MG/ML
0.8 INJECTION, SOLUTION INTRAMUSCULAR; INTRAVENOUS; SUBCUTANEOUS EVERY 2 HOUR PRN
Status: DISCONTINUED | OUTPATIENT
Start: 2023-05-13 | End: 2023-05-13

## 2023-05-13 RX ORDER — OXYCODONE HYDROCHLORIDE 5 MG/1
5 TABLET ORAL EVERY 4 HOURS PRN
Status: DISCONTINUED | OUTPATIENT
Start: 2023-05-13 | End: 2023-05-13

## 2023-05-13 RX ORDER — HYDROMORPHONE HYDROCHLORIDE 1 MG/ML
0.6 INJECTION, SOLUTION INTRAMUSCULAR; INTRAVENOUS; SUBCUTANEOUS EVERY 5 MIN PRN
Status: DISCONTINUED | OUTPATIENT
Start: 2023-05-13 | End: 2023-05-13 | Stop reason: HOSPADM

## 2023-05-13 RX ORDER — HYDROMORPHONE HYDROCHLORIDE 1 MG/ML
0.2 INJECTION, SOLUTION INTRAMUSCULAR; INTRAVENOUS; SUBCUTANEOUS EVERY 5 MIN PRN
Status: DISCONTINUED | OUTPATIENT
Start: 2023-05-13 | End: 2023-05-13 | Stop reason: HOSPADM

## 2023-05-13 RX ORDER — PROCHLORPERAZINE EDISYLATE 5 MG/ML
5 INJECTION INTRAMUSCULAR; INTRAVENOUS EVERY 8 HOURS PRN
Status: DISCONTINUED | OUTPATIENT
Start: 2023-05-13 | End: 2023-05-13

## 2023-05-13 RX ORDER — HEPARIN SODIUM 5000 [USP'U]/ML
5000 INJECTION, SOLUTION INTRAVENOUS; SUBCUTANEOUS EVERY 12 HOURS SCHEDULED
Status: DISCONTINUED | OUTPATIENT
Start: 2023-05-13 | End: 2023-05-13

## 2023-05-13 RX ORDER — PROCHLORPERAZINE EDISYLATE 5 MG/ML
5 INJECTION INTRAMUSCULAR; INTRAVENOUS EVERY 8 HOURS PRN
Status: DISCONTINUED | OUTPATIENT
Start: 2023-05-13 | End: 2023-05-13 | Stop reason: HOSPADM

## 2023-05-13 RX ORDER — NALOXONE HYDROCHLORIDE 0.4 MG/ML
80 INJECTION, SOLUTION INTRAMUSCULAR; INTRAVENOUS; SUBCUTANEOUS AS NEEDED
Status: DISCONTINUED | OUTPATIENT
Start: 2023-05-13 | End: 2023-05-13 | Stop reason: HOSPADM

## 2023-05-13 RX ORDER — ACETAMINOPHEN 500 MG
1000 TABLET ORAL EVERY 8 HOURS SCHEDULED
Status: DISCONTINUED | OUTPATIENT
Start: 2023-05-13 | End: 2023-05-13

## 2023-05-13 RX ORDER — LIDOCAINE HYDROCHLORIDE AND EPINEPHRINE 10; 10 MG/ML; UG/ML
INJECTION, SOLUTION INFILTRATION; PERINEURAL AS NEEDED
Status: DISCONTINUED | OUTPATIENT
Start: 2023-05-13 | End: 2023-05-13 | Stop reason: HOSPADM

## 2023-05-13 RX ORDER — POLYETHYLENE GLYCOL 3350 17 G/17G
17 POWDER, FOR SOLUTION ORAL DAILY PRN
Qty: 30 EACH | Refills: 0 | Status: SHIPPED | OUTPATIENT
Start: 2023-05-13

## 2023-05-13 RX ORDER — DEXAMETHASONE SODIUM PHOSPHATE 4 MG/ML
VIAL (ML) INJECTION AS NEEDED
Status: DISCONTINUED | OUTPATIENT
Start: 2023-05-13 | End: 2023-05-13 | Stop reason: SURG

## 2023-05-13 RX ORDER — MEPERIDINE HYDROCHLORIDE 25 MG/ML
12.5 INJECTION INTRAMUSCULAR; INTRAVENOUS; SUBCUTANEOUS AS NEEDED
Status: DISCONTINUED | OUTPATIENT
Start: 2023-05-13 | End: 2023-05-13 | Stop reason: HOSPADM

## 2023-05-13 RX ORDER — SODIUM CHLORIDE 9 MG/ML
INJECTION, SOLUTION INTRAVENOUS CONTINUOUS
Status: DISCONTINUED | OUTPATIENT
Start: 2023-05-13 | End: 2023-05-13

## 2023-05-13 RX ORDER — GLYCOPYRROLATE 0.2 MG/ML
INJECTION, SOLUTION INTRAMUSCULAR; INTRAVENOUS AS NEEDED
Status: DISCONTINUED | OUTPATIENT
Start: 2023-05-13 | End: 2023-05-13 | Stop reason: SURG

## 2023-05-13 RX ORDER — METOCLOPRAMIDE HYDROCHLORIDE 5 MG/ML
INJECTION INTRAMUSCULAR; INTRAVENOUS AS NEEDED
Status: DISCONTINUED | OUTPATIENT
Start: 2023-05-13 | End: 2023-05-13 | Stop reason: SURG

## 2023-05-13 RX ORDER — ACETAMINOPHEN 500 MG
1000 TABLET ORAL ONCE AS NEEDED
Status: DISCONTINUED | OUTPATIENT
Start: 2023-05-13 | End: 2023-05-13 | Stop reason: HOSPADM

## 2023-05-13 RX ORDER — ONDANSETRON 2 MG/ML
4 INJECTION INTRAMUSCULAR; INTRAVENOUS EVERY 6 HOURS PRN
Status: DISCONTINUED | OUTPATIENT
Start: 2023-05-13 | End: 2023-05-13

## 2023-05-13 RX ORDER — HYDROCODONE BITARTRATE AND ACETAMINOPHEN 5; 325 MG/1; MG/1
2 TABLET ORAL ONCE AS NEEDED
Status: DISCONTINUED | OUTPATIENT
Start: 2023-05-13 | End: 2023-05-13 | Stop reason: HOSPADM

## 2023-05-13 RX ORDER — OXYCODONE HYDROCHLORIDE 10 MG/1
10 TABLET ORAL EVERY 4 HOURS PRN
Status: DISCONTINUED | OUTPATIENT
Start: 2023-05-13 | End: 2023-05-13

## 2023-05-13 RX ORDER — KETOROLAC TROMETHAMINE 30 MG/ML
INJECTION, SOLUTION INTRAMUSCULAR; INTRAVENOUS AS NEEDED
Status: DISCONTINUED | OUTPATIENT
Start: 2023-05-13 | End: 2023-05-13 | Stop reason: SURG

## 2023-05-13 RX ORDER — SODIUM CHLORIDE, SODIUM LACTATE, POTASSIUM CHLORIDE, CALCIUM CHLORIDE 600; 310; 30; 20 MG/100ML; MG/100ML; MG/100ML; MG/100ML
INJECTION, SOLUTION INTRAVENOUS CONTINUOUS
Status: DISCONTINUED | OUTPATIENT
Start: 2023-05-13 | End: 2023-05-13 | Stop reason: HOSPADM

## 2023-05-13 RX ORDER — KETOROLAC TROMETHAMINE 15 MG/ML
15 INJECTION, SOLUTION INTRAMUSCULAR; INTRAVENOUS EVERY 6 HOURS
Status: DISCONTINUED | OUTPATIENT
Start: 2023-05-13 | End: 2023-05-13

## 2023-05-13 RX ORDER — MORPHINE SULFATE 4 MG/ML
4 INJECTION, SOLUTION INTRAMUSCULAR; INTRAVENOUS ONCE
Status: COMPLETED | OUTPATIENT
Start: 2023-05-13 | End: 2023-05-13

## 2023-05-13 RX ORDER — HYDROCODONE BITARTRATE AND ACETAMINOPHEN 5; 325 MG/1; MG/1
1 TABLET ORAL ONCE AS NEEDED
Status: DISCONTINUED | OUTPATIENT
Start: 2023-05-13 | End: 2023-05-13 | Stop reason: HOSPADM

## 2023-05-13 RX ORDER — ONDANSETRON 2 MG/ML
4 INJECTION INTRAMUSCULAR; INTRAVENOUS EVERY 6 HOURS PRN
Status: DISCONTINUED | OUTPATIENT
Start: 2023-05-13 | End: 2023-05-13 | Stop reason: HOSPADM

## 2023-05-13 RX ORDER — HYDROMORPHONE HYDROCHLORIDE 1 MG/ML
INJECTION, SOLUTION INTRAMUSCULAR; INTRAVENOUS; SUBCUTANEOUS
Status: COMPLETED
Start: 2023-05-13 | End: 2023-05-13

## 2023-05-13 RX ORDER — HYDROMORPHONE HYDROCHLORIDE 1 MG/ML
0.4 INJECTION, SOLUTION INTRAMUSCULAR; INTRAVENOUS; SUBCUTANEOUS EVERY 5 MIN PRN
Status: DISCONTINUED | OUTPATIENT
Start: 2023-05-13 | End: 2023-05-13 | Stop reason: HOSPADM

## 2023-05-13 RX ORDER — ONDANSETRON 2 MG/ML
4 INJECTION INTRAMUSCULAR; INTRAVENOUS ONCE
Status: COMPLETED | OUTPATIENT
Start: 2023-05-13 | End: 2023-05-13

## 2023-05-13 RX ORDER — BUPIVACAINE HYDROCHLORIDE 2.5 MG/ML
INJECTION, SOLUTION EPIDURAL; INFILTRATION; INTRACAUDAL AS NEEDED
Status: DISCONTINUED | OUTPATIENT
Start: 2023-05-13 | End: 2023-05-13 | Stop reason: HOSPADM

## 2023-05-13 RX ORDER — NEOSTIGMINE METHYLSULFATE 1 MG/ML
INJECTION, SOLUTION INTRAVENOUS AS NEEDED
Status: DISCONTINUED | OUTPATIENT
Start: 2023-05-13 | End: 2023-05-13 | Stop reason: SURG

## 2023-05-13 RX ORDER — HYDROMORPHONE HYDROCHLORIDE 1 MG/ML
0.4 INJECTION, SOLUTION INTRAMUSCULAR; INTRAVENOUS; SUBCUTANEOUS EVERY 2 HOUR PRN
Status: DISCONTINUED | OUTPATIENT
Start: 2023-05-13 | End: 2023-05-13

## 2023-05-13 RX ORDER — TRAMADOL HYDROCHLORIDE 50 MG/1
50 TABLET ORAL EVERY 6 HOURS PRN
Qty: 20 TABLET | Refills: 0 | Status: SHIPPED | OUTPATIENT
Start: 2023-05-13

## 2023-05-13 RX ORDER — ONDANSETRON 2 MG/ML
INJECTION INTRAMUSCULAR; INTRAVENOUS AS NEEDED
Status: DISCONTINUED | OUTPATIENT
Start: 2023-05-13 | End: 2023-05-13 | Stop reason: SURG

## 2023-05-13 RX ORDER — ROCURONIUM BROMIDE 10 MG/ML
INJECTION, SOLUTION INTRAVENOUS AS NEEDED
Status: DISCONTINUED | OUTPATIENT
Start: 2023-05-13 | End: 2023-05-13 | Stop reason: SURG

## 2023-05-13 RX ORDER — ONDANSETRON 4 MG/1
4 TABLET, ORALLY DISINTEGRATING ORAL EVERY 8 HOURS PRN
Qty: 10 TABLET | Refills: 0 | Status: SHIPPED | OUTPATIENT
Start: 2023-05-13

## 2023-05-13 RX ORDER — MIDAZOLAM HYDROCHLORIDE 1 MG/ML
1 INJECTION INTRAMUSCULAR; INTRAVENOUS EVERY 5 MIN PRN
Status: DISCONTINUED | OUTPATIENT
Start: 2023-05-13 | End: 2023-05-13 | Stop reason: HOSPADM

## 2023-05-13 RX ORDER — CEFOXITIN 1 G/1
INJECTION, POWDER, FOR SOLUTION INTRAVENOUS AS NEEDED
Status: DISCONTINUED | OUTPATIENT
Start: 2023-05-13 | End: 2023-05-13 | Stop reason: SURG

## 2023-05-13 RX ADMIN — METOCLOPRAMIDE HYDROCHLORIDE 10 MG: 5 INJECTION INTRAMUSCULAR; INTRAVENOUS at 14:10:00

## 2023-05-13 RX ADMIN — KETOROLAC TROMETHAMINE 30 MG: 30 INJECTION, SOLUTION INTRAMUSCULAR; INTRAVENOUS at 14:39:00

## 2023-05-13 RX ADMIN — ONDANSETRON 4 MG: 2 INJECTION INTRAMUSCULAR; INTRAVENOUS at 14:10:00

## 2023-05-13 RX ADMIN — NEOSTIGMINE METHYLSULFATE 3 MG: 1 INJECTION, SOLUTION INTRAVENOUS at 14:34:00

## 2023-05-13 RX ADMIN — GLYCOPYRROLATE 0.4 MG: 0.2 INJECTION, SOLUTION INTRAMUSCULAR; INTRAVENOUS at 14:34:00

## 2023-05-13 RX ADMIN — CEFOXITIN 2 G: 1 INJECTION, POWDER, FOR SOLUTION INTRAVENOUS at 13:40:00

## 2023-05-13 RX ADMIN — ROCURONIUM BROMIDE 30 MG: 10 INJECTION, SOLUTION INTRAVENOUS at 13:44:00

## 2023-05-13 RX ADMIN — SODIUM CHLORIDE: 9 INJECTION, SOLUTION INTRAVENOUS at 15:02:00

## 2023-05-13 RX ADMIN — DEXAMETHASONE SODIUM PHOSPHATE 4 MG: 4 MG/ML VIAL (ML) INJECTION at 14:10:00

## 2023-05-13 NOTE — OPERATIVE REPORT
OPERATIVE NOTE    [facility] SURGICAL AND CARDIOVASCULAR [facility] Location: OR   [facility] [id number] MRN [id number]   Admission Date 5/13/2023 Operation Date 5/13/2023   Attending Physician Tiff Gomez MD Operating Physician Danilo Deleon MD     PREOPERATIVE DIAGNOSIS  Acute appendicitis    POSTOPERATIVE DIAGNOSIS  Same    PROCEDURE PERFORMED  Laparoscopic appendectomy  Transversus abdominis plane block    SURGEON  Danilo Deleon MD    ASSISTANTS  None    ANESTHESIA  General    FINDINGS   Inflamed and thickened appendix without any purulence or perforation    INDICATIONS  41year old female presented to hospital with 1 day history of abdominal pain. CT imaging revealed dilated appendix consistent with acute appendicitis. Patient had elevated leukocytosis and mild tenderness on exam. Surgery was indicated. DESCRIPTION OF PROCEDURE  After informed consent, patient was brought to the operating room and placed in supine position. Bilateral SCDs were placed and pre-operative antibiotics administered. Anesthesia was induced without any complication. Patient was prepped and draped in the usual sterile fashion. Time out was performed confirming patient, procedure, and site. The Veress needle was used to gain pneumoperitoneum. Using blade, a curvilinear supraumbilical incision was made. Veress needle was inserted just under the umbilical stalk with audible clicks. Pneumoperitoneum was created with CO2. An 12mm port was inserted. Upon entrance, no injury was noted to omentum or bowel at site of entrance. Two 5mm ports were placed under direct visualization; the first left lower quadrant and the second in the suprapubic, triangulating toward the location of the appendix. Transversus abdominis plane block was performed. Using graspers, bowel was retracted and the appendix identified. Appendix was dilated and inflamed, no evidence of perforation of purulence. The base of the appendix was identified and a window created.  Using endo NATHANAEL stapler with blue load, the appendix was divided. Using a white load, the meoappendix was divided. The mesoappendix was further ligated with endoloop x1. Staple line at cecum and mesoappendix were both hemostatic after. Abdomen was irrigated. Appendix was placed in endocatch bag and removed from abdomen for pathology. Pneumoperitoneum was evacuated. Fascia at umbilicus was closed with o vicryl suture. Skin incisions were closed with 4-o monocryl. Incisions were washed and dressed with dermabond. At the end of the case, all counts were correct. Patient tolerated procedure well. Patient was awakened and transferred to PACU in a hemodynamically stable condition.     SPECIMENS REMOVED  Appendix    ESTIMATED BLOOD LOSS  5 ml    COMPLICATIONS  None     Saurav Osborne MD

## 2023-05-13 NOTE — ANESTHESIA PROCEDURE NOTES
Airway  Date/Time: 5/13/2023 1:46 PM  Urgency: elective      General Information and Staff    Patient location during procedure: OR  Anesthesiologist: Bella Vaughn MD  Performed: anesthesiologist   Performed by: Bella Vaughn MD  Authorized by: Bella Vaughn MD      Indications and Patient Condition  Indications for airway management: anesthesia  Sedation level: deep  Preoxygenated: yes  Patient position: sniffing  Mask difficulty assessment: 0 - not attempted    Final Airway Details  Final airway type: endotracheal airway      Successful airway: ETT  Cuffed: yes   Successful intubation technique: direct laryngoscopy  Facilitating devices/methods: rapid sequence intubation  Endotracheal tube insertion site: oral  Blade: Ngoc  Blade size: #3  ETT size (mm): 7.5    Cormack-Lehane Classification: grade IIA - partial view of glottis  Placement verified by: chest auscultation and capnometry   Measured from: lips  Number of attempts at approach: 1

## 2023-05-13 NOTE — ED QUICK NOTES
Orders for admission, patient is aware of plan and ready to go upstairs. Any questions, please call ED MAGDALENA leiva  at extension [phone]. Vaccinated?  Yes   Type of COVID test sent: rapid  COVID Suspicion level: Low      Titratable drug(s) infusing: mefoxin  Rate: 200ml/hr  0.9ns 125ml/hr  LOC at time of transport: aa&ox4    Other pertinent information: last po intake 2130 last night     CIWA score=  NIH score=

## 2023-05-13 NOTE — ANESTHESIA POSTPROCEDURE EVALUATION
[facility] Patient Status:  Observation   Age/Gender 41year old female MRN [id number]   Location [address] [facility] Attending Chelsea Samano MD   [facility] Day # 0 PCP Acosta Mitchell MD       Anesthesia Post-op Note    LAPAROSCOPIC APPENDECTOMY     Procedure Summary     Date: 05/13/23 Room / Location: [facility] [facility] MAIN OR 08 / [address] [facility] OR    Anesthesia Start: 4309 Anesthesia Stop: 5117    Procedure: LAPAROSCOPIC APPENDECTOMY (Abdomen) Diagnosis:       Acute appendicitis      (Acute appendicitis [K35.80])    Surgeons: Chelsea Samano MD Anesthesiologist: Neal Osborne MD    Anesthesia Type: general ASA Status: 1          Anesthesia Type: general    Vitals Value Taken Time   /67 05/13/23 1502   Temp 97.8 05/13/23 1504   Pulse 92 05/13/23 1504   Resp 18 05/13/23 1504   SpO2 96 % 05/13/23 1504   Vitals shown include unvalidated device data. Patient Location: PACU    Anesthesia Type: general    Airway Patency: patent and extubated    Postop Pain Control: inadequate, being treated    Mental Status: mildly sedated but able to meaningfully participate in the post-anesthesia evaluation    Nausea/Vomiting: none    Cardiopulmonary/Hydration status: stable euvolemic    Complications: no apparent anesthesia related complications    Postop vital signs: stable    Dental Exam: Unchanged from Preop    Patient to be discharged from PACU when criteria met.

## 2023-05-13 NOTE — PLAN OF CARE
Problem: Patient/Family Goals  Goal: Patient/Family Long Term Goal  Description: Patient's Long Term Goal: Discharge Home     Interventions:  - Diet Tolerance  -Diet Tolerance  -Return to normal ADL's    - See additional Care Plan goals for specific interventions  Outcome: Progressing  Goal: Patient/Family Short Term Goal  Description: Patient's Short Term Goal: Prepare to discharge home    Interventions:   - prn pain medication  - advance diet as tolerated     - See additional Care Plan goals for specific interventions  Outcome: Progressing

## 2023-05-13 NOTE — PROGRESS NOTES
NURSING ADMISSION NOTE      Patient admitted via Cart  Oriented to room. Safety precautions initiated. Bed in low position. Call light in reach. Patient arrived from ED in stable condition      Patient is alert and oriented. On room air. Abdomen is soft/ non-distended. Last BM was last night. Due to void. Patient rates pain 3/10. Tylenol and toradol given. On IVF. NPO with sips of meds. Patient scheduled for surgery later this afternoon. Call light within reach.

## 2023-05-14 NOTE — PROGRESS NOTES
NURSING DISCHARGE NOTE    Discharged Home via Ambulatory. Accompanied by Spouse  Belongings Taken by patient/family. IV taken out. Discharge instructions given to patient. Patient verbalized understanding. Prescriptions sent to patient's pharmacy.

## 2023-05-15 ENCOUNTER — PATIENT OUTREACH (OUTPATIENT)
Dept: CASE MANAGEMENT | Age: 48
End: 2023-05-15

## 2023-05-15 NOTE — PROGRESS NOTES
VM received; pt requesting assistance w/scheduling apt (dc 05/13)    Dr Rose Silva, [address]  [phone]  Follow up 2 weeks  Apt made:   Wed 5/24 @11:30am  Confirmed w/pt  Closing encounter

## 2023-05-23 ENCOUNTER — OFFICE VISIT (OUTPATIENT)
Facility: LOCATION | Age: 48
End: 2023-05-23

## 2023-05-23 DIAGNOSIS — Z90.49 S/P LAPAROSCOPIC APPENDECTOMY: Primary | ICD-10-CM

## 2023-05-23 PROCEDURE — 99024 POSTOP FOLLOW-UP VISIT: CPT | Performed by: STUDENT IN AN ORGANIZED HEALTH CARE EDUCATION/TRAINING PROGRAM

## 2023-06-09 RX ORDER — DICLOFENAC SODIUM 75 MG/1
TABLET, DELAYED RELEASE ORAL
Qty: 60 TABLET | Refills: 0 | Status: SHIPPED | OUTPATIENT
Start: 2023-06-09

## 2023-07-12 ENCOUNTER — MED REC SCAN ONLY (OUTPATIENT)
Dept: FAMILY MEDICINE CLINIC | Facility: CLINIC | Age: 48
End: 2023-07-12

## 2023-10-14 ENCOUNTER — OFFICE VISIT (OUTPATIENT)
Dept: FAMILY MEDICINE CLINIC | Facility: CLINIC | Age: 48
End: 2023-10-14
Payer: COMMERCIAL

## 2023-10-14 VITALS
HEIGHT: 62 IN | HEART RATE: 88 BPM | RESPIRATION RATE: 14 BRPM | SYSTOLIC BLOOD PRESSURE: 120 MMHG | WEIGHT: 161.81 LBS | TEMPERATURE: 98 F | BODY MASS INDEX: 29.78 KG/M2 | DIASTOLIC BLOOD PRESSURE: 76 MMHG

## 2023-10-14 DIAGNOSIS — M25.552 BILATERAL HIP PAIN: ICD-10-CM

## 2023-10-14 DIAGNOSIS — M54.9 UPPER BACK PAIN: ICD-10-CM

## 2023-10-14 DIAGNOSIS — M25.551 BILATERAL HIP PAIN: ICD-10-CM

## 2023-10-14 DIAGNOSIS — F51.01 PRIMARY INSOMNIA: ICD-10-CM

## 2023-10-14 DIAGNOSIS — M54.2 NECK PAIN: Primary | ICD-10-CM

## 2023-10-14 PROCEDURE — 3078F DIAST BP <80 MM HG: CPT | Performed by: FAMILY MEDICINE

## 2023-10-14 PROCEDURE — 99214 OFFICE O/P EST MOD 30 MIN: CPT | Performed by: FAMILY MEDICINE

## 2023-10-14 PROCEDURE — 3074F SYST BP LT 130 MM HG: CPT | Performed by: FAMILY MEDICINE

## 2023-10-14 PROCEDURE — 3008F BODY MASS INDEX DOCD: CPT | Performed by: FAMILY MEDICINE

## 2023-10-14 RX ORDER — DICLOFENAC SODIUM 75 MG/1
75 TABLET, DELAYED RELEASE ORAL 2 TIMES DAILY
Qty: 30 TABLET | Refills: 0 | Status: SHIPPED | OUTPATIENT
Start: 2023-10-14

## 2023-10-14 RX ORDER — ZOLPIDEM TARTRATE 6.25 MG/1
6.25 TABLET, FILM COATED, EXTENDED RELEASE ORAL NIGHTLY PRN
Qty: 30 TABLET | Refills: 0 | Status: SHIPPED | OUTPATIENT
Start: 2023-10-14

## 2023-10-14 RX ORDER — METHYLPREDNISOLONE 4 MG/1
TABLET ORAL
Qty: 1 EACH | Refills: 0 | Status: SHIPPED | OUTPATIENT
Start: 2023-10-14

## 2023-10-14 RX ORDER — CYCLOBENZAPRINE HCL 10 MG
10 TABLET ORAL NIGHTLY PRN
Qty: 30 TABLET | Refills: 1 | Status: SHIPPED | OUTPATIENT
Start: 2023-10-14

## 2023-10-14 NOTE — PATIENT INSTRUCTIONS
Start Medrol Dosepak and take per directions. Start diclofenac 75 mg tablet twice a day after finishing steroid pack. Cyclobenzaprine 1/2 to 1 tablet at bedtime do not drive when taking this medication. Use zolpidem 6.25 mg at bedtime as needed do not take medication when taking muscle relaxant. You can try warm compresses and Biofreeze to your neck. Schedule physical therapy. Call [phone] to schedule x-ray of your neck. Follow-up after physical therapy if there is no improvement in your symptoms.

## 2023-11-08 ENCOUNTER — HOSPITAL ENCOUNTER (OUTPATIENT)
Dept: GENERAL RADIOLOGY | Facility: HOSPITAL | Age: 48
Discharge: HOME | End: 2023-11-08
Attending: FAMILY MEDICINE
Payer: COMMERCIAL

## 2023-11-08 DIAGNOSIS — M54.2 NECK PAIN: ICD-10-CM

## 2023-11-08 PROCEDURE — 72050 X-RAY EXAM NECK SPINE 4/5VWS: CPT | Performed by: FAMILY MEDICINE

## 2023-11-10 DIAGNOSIS — M54.2 NECK PAIN: ICD-10-CM

## 2023-11-10 DIAGNOSIS — M25.551 BILATERAL HIP PAIN: ICD-10-CM

## 2023-11-10 DIAGNOSIS — M25.552 BILATERAL HIP PAIN: ICD-10-CM

## 2023-11-10 DIAGNOSIS — M54.9 UPPER BACK PAIN: Primary | ICD-10-CM

## 2023-11-13 RX ORDER — DICLOFENAC SODIUM 75 MG/1
75 TABLET, DELAYED RELEASE ORAL 2 TIMES DAILY
Qty: 60 TABLET | Refills: 0 | Status: SHIPPED | OUTPATIENT
Start: 2023-11-13

## 2023-11-13 NOTE — TELEPHONE ENCOUNTER
LOV:10/14/2023   for: neck pain, upper back pain, hip pain   Patient advised to RTC on: Follow-up after physical therapy if there is no improvement in your symptoms. Medication Quantity Refills Start End   diclofenac 75 MG Oral Tab EC 30 tablet 0 10/14/2023    Sig:   Take 1 tablet (75 mg total) by mouth 2 (two) times daily.

## 2023-12-08 ENCOUNTER — TELEPHONE (OUTPATIENT)
Dept: PHYSICAL THERAPY | Facility: HOSPITAL | Age: 48
End: 2023-12-08

## 2023-12-12 ENCOUNTER — OFFICE VISIT (OUTPATIENT)
Dept: PHYSICAL THERAPY | Age: 48
End: 2023-12-12
Attending: FAMILY MEDICINE
Payer: COMMERCIAL

## 2023-12-12 DIAGNOSIS — M54.9 UPPER BACK PAIN: ICD-10-CM

## 2023-12-12 DIAGNOSIS — M54.2 NECK PAIN: Primary | ICD-10-CM

## 2023-12-12 DIAGNOSIS — M25.552 BILATERAL HIP PAIN: ICD-10-CM

## 2023-12-12 DIAGNOSIS — M25.551 BILATERAL HIP PAIN: ICD-10-CM

## 2023-12-12 PROCEDURE — 97110 THERAPEUTIC EXERCISES: CPT

## 2023-12-12 PROCEDURE — 97161 PT EVAL LOW COMPLEX 20 MIN: CPT

## 2023-12-19 ENCOUNTER — OFFICE VISIT (OUTPATIENT)
Dept: PHYSICAL THERAPY | Age: 48
End: 2023-12-19
Attending: FAMILY MEDICINE
Payer: COMMERCIAL

## 2023-12-19 PROCEDURE — 97110 THERAPEUTIC EXERCISES: CPT

## 2023-12-19 NOTE — PROGRESS NOTES
Diagnosis:   Neck pain (M54.2)  Upper back pain (M54.9)  Bilateral hip pain (M25.551,M25.552)      Referring Provider: Isa Koenig  Date of Evaluation:   12/12/2023    Precautions:  Drug Allergy Next MD visit:   none scheduled  Date of Surgery: n/a   Insurance Primary/Secondary: [employer] [place] / [place]     # Auth Visits: Jacklyn Quinonez after 30v            Subjective: Today is not a good day, thinks could be the cold. Neck more sore than back today    Pain: 4-5/10 neck & low back. Took meds about 3:45      Objective: See Flowsheet for details      Assessment: Pt able to complete LTR in slightly larger range than at IE. Pt started to experience numbness to the L>R hand when in the supine position so had pt assume sitting position with reduced symptoms present. Best gianna to mid doorway pec stretch vs low & high though deferred further stretching after pt reported aggravation L shoulder pain in this position with stretch. PT goal to provide pt with sufficient mobility/ stretching activities today without increasing pain. Having pt defer further reps of ex's upon increased pain noted. Able to stretch further on the R with seated HS stretch. Goals: (to be met in 8 visits)   Pt will improve transversus abdominis recruitment to perform proper isometric contraction without requiring verbal or tactile cuing to promote advancement of therex   Pt will demonstrate good understanding of proper posture and body mechanics to decrease pain and improve spinal safety as well as have improved tolerance to prolonged sitting for work duties.    Pt will improve lumbar spine AROM flexion to WNL to allow increase ease with bending forward to don shoes  Pt will demonstrate improved core strength to be able to perform lifting/ carrying 10# with <3/10 pain  Pt will improve cervical AROM rotation to Min restricted or better R/L to improve tolerance for turning head to check blind spot while driving  Pt will report the ability to sleep at night waking max of 1x from pain for improved ability to function daytime with work duties  Pt will be independent and compliant with comprehensive HEP to maintain progress achieved in PT    Plan: Continue mobility/ stretching with incorporation of strengthening as indicated avoiding increased pain. Date: 12/19/2023  TX#: 2/8 Date:                 TX#: 3/ Date:                 TX#: 4/ Date:                 TX#: 5/ Date: Tx#: 6/   Therex: 38'  NuStep L3 x 6'  Slantboard gastroc stretch 3x30\" B  LTR x 8 ea  PPT x 10  Supine cervical retractions x 8  Seated scap retractions 1x10, 1x8  Doorway pec stretch: trial of low, mid, high. 1x15\" B defer 2/2 L shld pain  Seated SB roll for lumbar flex stretch x 10  Seated HS stretch 3x15\" ea  Education: ice vs heat. Pt to self-assess for how long symptoms take to settle to baseline after visit today. HEP:  Access Code: [email or web address]  URL: [email or web address]  Date: 12/12/2023  Prepared by: Willy Gun    Exercises  - Supine Posterior Pelvic Tilt  - 1 x daily - 7 x weekly - 1-2 sets - 10 reps - 2-3 sec hold  - Supine Lower Trunk Rotation  - 1-2 x daily - 7 x weekly - 1 sets - 10 reps - 3-5 sec hold  - Seated Scapular Retraction  - 1-2 x daily - 7 x weekly - 1 sets - 10 reps - 2-3 sec hold    Charges:  Therex x 3       Total Timed Treatment: 38 min  Total Treatment Time: 38 min

## 2023-12-27 ENCOUNTER — APPOINTMENT (OUTPATIENT)
Dept: PHYSICAL THERAPY | Age: 48
End: 2023-12-27
Attending: FAMILY MEDICINE

## 2024-01-05 NOTE — PROGRESS NOTES
Diagnosis:   Neck pain (M54.2)  Upper back pain (M54.9)  Bilateral hip pain (M25.551,M25.552)      Referring Provider: Kyleigh  Date of Evaluation:   12/12/2023    Precautions:  Drug Allergy    hx R patellar dislocation & R shoulder dislocation Next MD visit:   none scheduled  Date of Surgery: n/a   Insurance Primary/Secondary: [place]     # Auth Visits: auth after 30v            Subjective: Numbness to hands (4th & 5th digits) bothering her the most. Noted sitting watching TV in recliner: + numbness B hands. Leg symptoms more when she wakes up in the morning. Took her about 2 days to return to baseline after last visit.    Pain: 5/10      Objective: See Flowsheet for details      Assessment: Despite symptoms still being noted, pt did have improved tolerance to session today compared to prior visit. Increased pain with flat back stretch so deferred further reps. Better with several reps of standing lumbar extension afterwards.      Goals: (to be met in 8 visits)   Pt will improve transversus abdominis recruitment to perform proper isometric contraction without requiring verbal or tactile cuing to promote advancement of therex   Pt will demonstrate good understanding of proper posture and body mechanics to decrease pain and improve spinal safety as well as have improved tolerance to prolonged sitting for work duties.   Pt will improve lumbar spine AROM flexion to WNL to allow increase ease with bending forward to don shoes  Pt will demonstrate improved core strength to be able to perform lifting/ carrying 10# with <3/10 pain  Pt will improve cervical AROM rotation to Min restricted or better R/L to improve tolerance for turning head to check blind spot while driving  Pt will report the ability to sleep at night waking max of 1x from pain for improved ability to function daytime with work duties  Pt will be independent and compliant with comprehensive HEP to maintain progress achieved in  PT    Plan: Continue mobility/ stretching with incorporation of strengthening as indicated avoiding increased pain.   Date: 12/19/2023  TX#: 2/8 Date: 1/5/2024  TX#: 3/8 Date:                 TX#: 4/ Date:                 TX#: 5/ Date:   Tx#: 6/   Therex: 38'  NuStep L3 x 6'  Slantboard gastroc stretch 3x30\" B  LTR x 8 ea  PPT x 10  Supine cervical retractions x 8  Seated scap retractions 1x10, 1x8  Doorway pec stretch: trial of low, mid, high. 1x15\" B defer 2/2 L shld pain  Seated SB roll for lumbar flex stretch x 10  Seated HS stretch 3x15\" ea  Education: ice vs heat. Pt to self-assess for how long symptoms take to settle to baseline after visit today. Therex: 43'  UBE L1 2' fwd, 2' bwd (UE only)  Prone (over 1 pillow) scap retractions 2x10  Prone (over 1 pillow) quad stretch with strap 3x30\" ea  Seated HS stretch 3x20\" ea  Seated figure-4 piriformis stretch 3x20\" ea  Footwear possible recommendations: Shaheed William slip ins, Oofos (to avoid having to bend/ strain to don & tie footwear)  Seated cervical retractions x 20  Seated SB roll for lumbar flex stretch x 10  Trial of flat back stretch @ // bars (with BUE folded/ bent) - defer  Standing lumbar ext small range x 3      HEP:  Access Code: [email or web address]  URL: [email or web address]  Date: 12/12/2023  Prepared by: Federica Barrientos    Exercises  - Supine Posterior Pelvic Tilt  - 1 x daily - 7 x weekly - 1-2 sets - 10 reps - 2-3 sec hold  - Supine Lower Trunk Rotation  - 1-2 x daily - 7 x weekly - 1 sets - 10 reps - 3-5 sec hold  - Seated Scapular Retraction  - 1-2 x daily - 7 x weekly - 1 sets - 10 reps - 2-3 sec hold    Charges: Therex x 3       Total Timed Treatment: 43 min  Total Treatment Time: 43 min

## 2024-01-08 NOTE — PATIENT INSTRUCTIONS
Use flonase-- 2 sprays each nostril at bedtime.  To make sure you're using it properly-- look at the floor, insert the nozzle into the nasal opening and aim the spray toward the ceiling.    Use a gentle sniff when you spray the flonase into the nostril. Avoid using a big \"sniff\" which will send the spray to the back of the throat.  Repeat on the other side.   Don't blow nose for 30 minutes after nasal spray use if possible.    If no better in 1-2 weeks, follow-up with your PCP for further evaluation.

## 2024-01-08 NOTE — PROGRESS NOTES
CHIEF COMPLAINT:     Chief Complaint   Patient presents with    Ear Problem     Entered by patient-- doesn't feel \"right\".  Feels off balance when moving quickly for the last week. Getting better over time.  No recent cold sx. No fever. No neurological symptoms.        HPI:   Dai Cannon is a 48 year old female who presents to clinic today with complaints of right ear symptoms. Feels full but can still hear from it.  Pt states has felt off balance with some movements for about a week. Denies ear pain. Patient denies history of ear infections.   Home treatment includes nothing so far.     Associated symptoms:  Patient denies decreased hearing.  Patient denies fever. Patient denies hearing loss. Patient denies drainage. Patient denies use of Q-tips to clean the ears. Patient denies nasal congestion.     Current Outpatient Medications   Medication Sig Dispense Refill    diclofenac 75 MG Oral Tab EC Take 1 tablet (75 mg total) by mouth 2 (two) times daily. 60 tablet 0    Zolpidem Tartrate ER 6.25 MG Oral Tab CR Take 1 tablet (6.25 mg total) by mouth nightly as needed for Sleep. 30 tablet 0    methylPREDNISolone (MEDROL) 4 MG Oral Tablet Therapy Pack As directed. 1 each 0    cyclobenzaprine 10 MG Oral Tab Take 1 tablet (10 mg total) by mouth nightly as needed for Muscle spasms. 30 tablet 1    EPINEPHrine 0.3 MG/0.3ML Injection Solution Auto-injector Inject 0.3 mL (1 each total) as directed one time.      predniSONE 20 MG Oral Tab 3 tabs po in emergency, then as directed 15 tablet 0    Levocetirizine Dihydrochloride 5 MG Oral Tab Take 1 tablet (5 mg total) by mouth every evening.      LINZESS 145 MCG Oral Cap TAKE 145 MCG BY MOUTH DAILY. 30 capsule 3    Azelastine HCl 0.1 % Nasal Solution 2 sprays by Nasal route 2 (two) times daily. 1 Bottle 2     Current Facility-Administered Medications   Medication Dose Route Frequency Provider Last Rate Last Admin    Levonorgestrel (Mirena) IUD 1 each  1 each Intrauterine  Once Milka Cross MD          Past Medical History:   Diagnosis Date    Abdominal pain     Bloating     Constipation     Diarrhea, unspecified     Food intolerance     Irregular bowel habits     Weight gain       Social History:  Social History     Socioeconomic History    Marital status:    Tobacco Use    Smoking status: Never    Smokeless tobacco: Never   Vaping Use    Vaping Use: Never used   Substance and Sexual Activity    Alcohol use: Yes     Comment: drinks 6-8 oz of sangria/wine daily.     Drug use: No    Sexual activity: Yes     Partners: Male     Birth control/protection: I.U.D.     Comment: Mirena   Other Topics Concern    Caffeine Concern Yes     Comment: 2 cps a day coffee    Exercise No    Seat Belt Yes    Self-Exams Yes        REVIEW OF SYSTEMS:   GENERAL: Feeling well otherwise.    SKIN: no unusual skin lesions or rashes  HEENT: See HPI  LUNGS: No cough, shortness of breath, or wheezing.  CARDIOVASCULAR: No chest pain, palpitations  GI: No N/V/C/D. No abdominal pain.  NEURO: denies headaches or dizziness    EXAM:   Pulse 81   Temp 98.2 °F (36.8 °C) (Temporal)   SpO2 99%   GENERAL: well developed, well nourished,in no apparent distress  SKIN: no rashes,no suspicious lesions  HEAD: atraumatic, normocephalic  EYES: conjunctivae clear, EOM intact  EARS: Tragus non tender on palpation bilaterally. External auditory canals: patent b/l.  Right TM: pearly, no bulging, +retraction,no effusion, bony landmarks present.  Left TM: pearly, no bulging, no retraction,no effusion, bony landmarks present.  NOSE: nostrils patent, clear exudates, nasal mucosa pink and slightly boggy.  THROAT: oral mucosa pink, moist. Posterior pharynx is not erythematous or injected. No exudates.  NECK: supple, non-tender  LUNGS: clear to auscultation bilaterally, no wheezes or rhonchi. Breathing is non labored.  CARDIO: RRR without murmur  EXTREMITIES: no cyanosis, clubbing or edema  LYMPH: no lymphadenopathy.       ASSESSMENT AND PLAN:   Dai Cannon is a 48 year old female who presents with:    ASSESSMENT:  Encounter Diagnoses   Name Primary?    Acute dysfunction of right eustachian tube Yes    Retraction of tympanic membrane of right ear        PLAN: Meds as listed below.  Comfort measures as described in Patient Instructions    Meds & Refills for this Visit:  Requested Prescriptions      No prescriptions requested or ordered in this encounter         Risk and benefits of medication discussed. Stressed importance of completing full course of antibiotic.     Tylenol/Motrin prn pain.      Call or return if s/sx worsen, do not improve in 3 days, or if fever of 100.4 or greater persists for 72 hours.    Patient Instructions   Use flonase-- 2 sprays each nostril at bedtime.  To make sure you're using it properly-- look at the floor, insert the nozzle into the nasal opening and aim the spray toward the ceiling.    Use a gentle sniff when you spray the flonase into the nostril. Avoid using a big \"sniff\" which will send the spray to the back of the throat.  Repeat on the other side.   Don't blow nose for 30 minutes after nasal spray use if possible.    If no better in 1-2 weeks, follow-up with your PCP for further evaluation.         Patient voiced understand and is in agreement with treatment plan.

## 2024-01-09 NOTE — PROGRESS NOTES
Diagnosis:   Neck pain (M54.2)  Upper back pain (M54.9)  Bilateral hip pain (M25.551,M25.552)      Referring Provider: Kyleigh  Date of Evaluation:   12/12/2023    Precautions:  Drug Allergy    hx R patellar dislocation & R shoulder dislocation Next MD visit:   none scheduled  Date of Surgery: n/a   Insurance Primary/Secondary: [place]     # Auth Visits: auth after 30v            Subjective: \" My symptoms are feeling the same and I continue to feeling  tingling in my both hands and lateral arm . I wake up in AM with both feel tingling and sometimes numb . I feel like sitting straight reduce hands tinging \".    Pain: 5/10      Objective: See Flowsheet for details      Assessment: continued with gentle cervical and lumbar extension based stretches in order to facilitate and improve neutral posture with prolonged sitting and standing activities . Initiated gentle core strength ex's in supine position for improved trunk stability . Patient demo fair TrA activation and required mod verbal cueing to ensure proper form . Patient was able to perform given ex's without report of increase in her symptoms.      Goals: (to be met in 8 visits)   Pt will improve transversus abdominis recruitment to perform proper isometric contraction without requiring verbal or tactile cuing to promote advancement of therex   Pt will demonstrate good understanding of proper posture and body mechanics to decrease pain and improve spinal safety as well as have improved tolerance to prolonged sitting for work duties.   Pt will improve lumbar spine AROM flexion to WNL to allow increase ease with bending forward to don shoes  Pt will demonstrate improved core strength to be able to perform lifting/ carrying 10# with <3/10 pain  Pt will improve cervical AROM rotation to Min restricted or better R/L to improve tolerance for turning head to check blind spot while driving  Pt will report the ability to sleep at night waking  max of 1x from pain for improved ability to function daytime with work duties  Pt will be independent and compliant with comprehensive HEP to maintain progress achieved in PT    Plan: Continue mobility/ stretching with incorporation of strengthening as indicated avoiding increased pain.   Date: 12/19/2023  TX#: 2/8 Date: 1/5/2024  TX#: 3/8 Date: 1/08/24                TX#: 4/8 Date:                 TX#: 5/ Date:   Tx#: 6/   Therex: 38'  NuStep L3 x 6'  Slantboard gastroc stretch 3x30\" B  LTR x 8 ea  PPT x 10  Supine cervical retractions x 8  Seated scap retractions 1x10, 1x8  Doorway pec stretch: trial of low, mid, high. 1x15\" B defer 2/2 L shld pain  Seated SB roll for lumbar flex stretch x 10  Seated HS stretch 3x15\" ea  Education: ice vs heat. Pt to self-assess for how long symptoms take to settle to baseline after visit today. Therex: 43'  UBE L1 2' fwd, 2' bwd (UE only)  Prone (over 1 pillow) scap retractions 2x10  Prone (over 1 pillow) quad stretch with strap 3x30\" ea  Seated HS stretch 3x20\" ea  Seated figure-4 piriformis stretch 3x20\" ea  Footwear possible recommendations: Shaheed William slip ins, [employer] (to avoid having to bend/ strain to don & tie footwear)  Seated cervical retractions x 20  Seated SB roll for lumbar flex stretch x 10  Trial of flat back stretch @ // bars (with BUE folded/ bent) - defer  Standing lumbar ext small range x 3 Therex x 40 min   Nu step x 6 min , level 5  Supine :  Cervical retraction AROM x 10 x 2   Cervical retraction with self OP x 10 x 2   Pectoral stretch  x 10 with 10 sec hold   Serratus punches AROM x 10   R/L piriformis stretch ( modified figure 4 ) R/L x 10 reps with 10 sec hold   PPT x 10  TrA activation with adductors x 10 with 5 sec hold   TrA activation with abductors with fitness ring x 10 with 5 sec hold   Bridging ( 1/2 ) x 10  Prone :  QUEENIE  2 x 10   Standing :  TANJA with hips over fulcrum  2 x 10      HEP:  Access Code: [email or web address]  URL:  [email or web address]  Date: 12/12/2023  Prepared by: Federica Barrientos    Exercises  - Supine Posterior Pelvic Tilt  - 1 x daily - 7 x weekly - 1-2 sets - 10 reps - 2-3 sec hold  - Supine Lower Trunk Rotation  - 1-2 x daily - 7 x weekly - 1 sets - 10 reps - 3-5 sec hold  - Seated Scapular Retraction  - 1-2 x daily - 7 x weekly - 1 sets - 10 reps - 2-3 sec hold    Charges: Therex x 3       Total Timed Treatment: 40 min  Total Treatment Time: 45 min

## 2024-01-15 NOTE — PROGRESS NOTES
Diagnosis:   Neck pain (M54.2)  Upper back pain (M54.9)  Bilateral hip pain (M25.551,M25.552)      Referring Provider: Kyleigh  Date of Evaluation:   12/12/2023    Precautions:  Drug Allergy    hx R patellar dislocation & R shoulder dislocation Next MD visit:   none scheduled  Date of Surgery: n/a   Insurance Primary/Secondary: [place]     # Auth Visits: auth after 30v            Subjective: \" My neck and lower back symptoms got worse after last visit and I had a lot of numbness and tingling in my arms \".\" Today my pain level is 6/10 in my neck but my lower back feels OK \".    Pain: 6/10   ( cervical spine )      Objective: See Flowsheet for details      Assessment: continued with gentle lumbar AROM ex's and trunk strength in supine position and initiated scapula strength in standing position to further facilitate and promote neutral posture with sustained sitting and standing activities .Mod cueing was needed for posture with scapula strength ex's .Patient demo improved TrA activation with PPT and was able to perform all given ex's without report of increase in lower back pain .      Goals: (to be met in 8 visits)   Pt will improve transversus abdominis recruitment to perform proper isometric contraction without requiring verbal or tactile cuing to promote advancement of therex   Pt will demonstrate good understanding of proper posture and body mechanics to decrease pain and improve spinal safety as well as have improved tolerance to prolonged sitting for work duties.   Pt will improve lumbar spine AROM flexion to WNL to allow increase ease with bending forward to don shoes  Pt will demonstrate improved core strength to be able to perform lifting/ carrying 10# with <3/10 pain  Pt will improve cervical AROM rotation to Min restricted or better R/L to improve tolerance for turning head to check blind spot while driving  Pt will report the ability to sleep at night waking max of 1x from  pain for improved ability to function daytime with work duties  Pt will be independent and compliant with comprehensive HEP to maintain progress achieved in PT    Plan: Continue mobility/ stretching with incorporation of strengthening as indicated avoiding increased pain. Patient will schedule 3 additional visits  and then will anticipate to see her MD for follow up visit .  Date: 12/19/2023  TX#: 2/8 Date: 1/5/2024  TX#: 3/8 Date: 1/08/24                TX#: 4/8 Date:  1/15/24               TX#: 5/8 Date:   Tx#: 6/   Therex: 38'  NuStep L3 x 6'  Slantboard gastroc stretch 3x30\" B  LTR x 8 ea  PPT x 10  Supine cervical retractions x 8  Seated scap retractions 1x10, 1x8  Doorway pec stretch: trial of low, mid, high. 1x15\" B defer 2/2 L shld pain  Seated SB roll for lumbar flex stretch x 10  Seated HS stretch 3x15\" ea  Education: ice vs heat. Pt to self-assess for how long symptoms take to settle to baseline after visit today. Therex: 43'  UBE L1 2' fwd, 2' bwd (UE only)  Prone (over 1 pillow) scap retractions 2x10  Prone (over 1 pillow) quad stretch with strap 3x30\" ea  Seated HS stretch 3x20\" ea  Seated figure-4 piriformis stretch 3x20\" ea  Footwear possible recommendations: [employer], [employer] slip ins, Oofos (to avoid having to bend/ strain to don & tie footwear)  Seated cervical retractions x 20  Seated SB roll for lumbar flex stretch x 10  Trial of flat back stretch @ // bars (with BUE folded/ bent) - defer  Standing lumbar ext small range x 3 Therex x 40 min   Nu step x 6 min , level 5  Supine :  Cervical retraction AROM x 10 x 2   Cervical retraction with self OP x 10 x 2   Pectoral stretch  x 10 with 10 sec hold   Serratus punches AROM x 10   R/L piriformis stretch ( modified figure 4 ) R/L x 10 reps with 10 sec hold   PPT x 10  TrA activation with adductors x 10 with 5 sec hold   TrA activation with abductors with fitness ring x 10 with 5 sec hold   Bridging ( 1/2 ) x 10  Prone :  QUEENIE  2 x 10   Standing :  TANJA  with hips over fulcrum  2 x 10  Therex x 40 min   NU step x 6 min , level 4   Standing :  Pectoral stretch in doorway x 5 with 10 sec hold   Standing against 1/2 FR :  Scapula retraction AROM x 20  B sh rows x 20  B sh ext x 20  Supine :  PPT x 20  TrA activation with adductors with yellow ball x 20  TrA activation with abductors activation with fitness [place] x 10   TrA activation single knee fall out x 10   LTR ( mid range ) x 10  TrA activation with alt heel lift x 10 x 2     HEP:  Access Code: [email or web address]  URL: [email or web address]  Date: 12/12/2023  Prepared by: Federica Barrientos    Exercises  - Supine Posterior Pelvic Tilt  - 1 x daily - 7 x weekly - 1-2 sets - 10 reps - 2-3 sec hold  - Supine Lower Trunk Rotation  - 1-2 x daily - 7 x weekly - 1 sets - 10 reps - 3-5 sec hold  - Seated Scapular Retraction  - 1-2 x daily - 7 x weekly - 1 sets - 10 reps - 2-3 sec hold    Charges: Therex x 3       Total Timed Treatment: 40 min  Total Treatment Time: 45 min

## 2024-01-26 NOTE — PROGRESS NOTES
Diagnosis:   Neck pain (M54.2)  Upper back pain (M54.9)  Bilateral hip pain (M25.551,M25.552)      Referring Provider: Kyleigh  Date of Evaluation:   12/12/2023    Precautions:  Drug Allergy    hx R patellar dislocation & R shoulder dislocation Next MD visit:   none scheduled  Date of Surgery: n/a   Insurance Primary/Secondary: [place]     # Auth Visits: auth after 30v            Subjective: Pt reports busy week. No current numbness/ tingling. However upper back most discomfort at the moment. Did some of the exercises at home.    Pain: 5-6/10; pt took diclofenac before coming to PT      Objective: See Flowsheet for details      Assessment: Initiated light manual treatment for the neck/ shoulder as listed below in flowsheet. Pt was advised during manual treatment to keep PT posted on symptoms to ensure ongoing treatment indicated. Pt had overall good tolerance to manual treatment today. Educated in potential for normal soreness from manual treatment & may ice/ heat as indicated; pt to keep PT posted next visit to determine if should continue to incorporate. Limited by varied pain during treatment.      Goals: (to be met in 8 visits)   Pt will improve transversus abdominis recruitment to perform proper isometric contraction without requiring verbal or tactile cuing to promote advancement of therex   Pt will demonstrate good understanding of proper posture and body mechanics to decrease pain and improve spinal safety as well as have improved tolerance to prolonged sitting for work duties.   Pt will improve lumbar spine AROM flexion to WNL to allow increase ease with bending forward to don shoes  Pt will demonstrate improved core strength to be able to perform lifting/ carrying 10# with <3/10 pain  Pt will improve cervical AROM rotation to Min restricted or better R/L to improve tolerance for turning head to check blind spot while driving  Pt will report the ability to sleep at night waking  max of 1x from pain for improved ability to function daytime with work duties  Pt will be independent and compliant with comprehensive HEP to maintain progress achieved in PT    Plan: Pt on track for tentative discharge in 2 visits.  Date: 12/19/2023  TX#: 2/8 Date: 1/5/2024  TX#: 3/8 Date: 1/08/24                TX#: 4/8 Date:  1/15/24               TX#: 5/8 Date: 1/26/2024  Tx#: 6/8   Therex: 38'  NuStep L3 x 6'  Slantboard gastroc stretch 3x30\" B  LTR x 8 ea  PPT x 10  Supine cervical retractions x 8  Seated scap retractions 1x10, 1x8  Doorway pec stretch: trial of low, mid, high. 1x15\" B defer 2/2 L shld pain  Seated SB roll for lumbar flex stretch x 10  Seated HS stretch 3x15\" ea  Education: ice vs heat. Pt to self-assess for how long symptoms take to settle to baseline after visit today. Therex: 43'  UBE L1 2' fwd, 2' bwd (UE only)  Prone (over 1 pillow) scap retractions 2x10  Prone (over 1 pillow) quad stretch with strap 3x30\" ea  Seated HS stretch 3x20\" ea  Seated figure-4 piriformis stretch 3x20\" ea  Footwear possible recommendations: Kizik, Sketchers slip ins, Oofos (to avoid having to bend/ strain to don & tie footwear)  Seated cervical retractions x 20  Seated SB roll for lumbar flex stretch x 10  Trial of flat back stretch @ // bars (with BUE folded/ bent) - defer  Standing lumbar ext small range x 3 Therex x 40 min   Nu step x 6 min , level 5  Supine :  Cervical retraction AROM x 10 x 2   Cervical retraction with self OP x 10 x 2   Pectoral stretch  x 10 with 10 sec hold   Serratus punches AROM x 10   R/L piriformis stretch ( modified figure 4 ) R/L x 10 reps with 10 sec hold   PPT x 10  TrA activation with adductors x 10 with 5 sec hold   TrA activation with abductors with fitness ring x 10 with 5 sec hold   Bridging ( 1/2 ) x 10  Prone :  QUEENIE  2 x 10   Standing :  TANJA with hips over fulcrum  2 x 10  Therex x 40 min   NU step x 6 min , level 4   Standing :  Pectoral stretch in doorway x 5 with 10 sec  hold   Standing against 1/2 FR :  Scapula retraction AROM x 20  B sh rows x 20  B sh ext x 20  Supine :  PPT x 20  TrA activation with adductors with yellow ball x 20  TrA activation with abductors activation with fitness [place] x 10   TrA activation single knee fall out x 10   LTR ( mid range ) x 10  TrA activation with alt heel lift x 10 x 2  Therex: 35'  Discharge Planning  NuStep L5 x 6' BUE & BLE    Supine :  PPT x 20  TrA activation with adductors with yellow ball x 25  TrA activation with abductors activation with fitness [place] x 15  TrA activation single knee fall out x 10  LTR ( mid range ) x 10    Standing against 1/2 FR :  Scapula retraction AROM x 20    B sh rows x 10 Y  B shld ext x 10 Y    Manual Therapy: 10'  Gentle small range cervical traction  Light suboccipital release  Light STM B UT & distal scalenes  Gr 1-2 B first rib depression mob   HEP:  Access Code: [email or web address]  URL: [email or web address]  Date: 12/12/2023  Prepared by: Federica Barrientos    Exercises  - Supine Posterior Pelvic Tilt  - 1 x daily - 7 x weekly - 1-2 sets - 10 reps - 2-3 sec hold  - Supine Lower Trunk Rotation  - 1-2 x daily - 7 x weekly - 1 sets - 10 reps - 3-5 sec hold  - Seated Scapular Retraction  - 1-2 x daily - 7 x weekly - 1 sets - 10 reps - 2-3 sec hold    Charges: Therex x 2, Manual x 1       Total Timed Treatment: 45 min  Total Treatment Time: 45 min

## 2024-01-30 NOTE — PATIENT INSTRUCTIONS
Access Code: [email or web address]  URL: [email or web address]  Date: 01/30/2024  Prepared by: Federica Barrientos    Exercises  - Hooklying Isometric Hip Abduction with Belt  - 1 x daily - 3 x weekly - 1-2 sets - 10 reps - 5 sec hold  - Supine Hip Adduction Isometric with Ball  - 1 x daily - 3 x weekly - 1-2 sets - 10 reps - 5 sec hold

## 2024-01-30 NOTE — PROGRESS NOTES
Diagnosis:   Neck pain (M54.2)  Upper back pain (M54.9)  Bilateral hip pain (M25.551,M25.552)      Referring Provider: Kyleigh  Date of Evaluation:   12/12/2023    Precautions:  Drug Allergy    hx R patellar dislocation & R shoulder dislocation Next MD visit:   none scheduled  Date of Surgery: n/a   Insurance Primary/Secondary: [place]     # Auth Visits: auth after 30v            Subjective: Pt reports feeling relief from the manual treatment last visit.    Pain: 4-5/10      Objective: See Flowsheet for details      Assessment: Though good tolerance to half foam roll stretch from the neck perspective, deferred after two minutes due to coccygeal discomfort which was unable to be reduced even with folded towel support. Able to progress hip abduction & adduction isometric ex's to HEP as listed below.      Goals: (to be met in 8 visits)   Pt will improve transversus abdominis recruitment to perform proper isometric contraction without requiring verbal or tactile cuing to promote advancement of therex   Pt will demonstrate good understanding of proper posture and body mechanics to decrease pain and improve spinal safety as well as have improved tolerance to prolonged sitting for work duties.   Pt will improve lumbar spine AROM flexion to WNL to allow increase ease with bending forward to don shoes  Pt will demonstrate improved core strength to be able to perform lifting/ carrying 10# with <3/10 pain  Pt will improve cervical AROM rotation to Min restricted or better R/L to improve tolerance for turning head to check blind spot while driving  Pt will report the ability to sleep at night waking max of 1x from pain for improved ability to function daytime with work duties  Pt will be independent and compliant with comprehensive HEP to maintain progress achieved in PT    Plan: Pt on track for tentative discharge in 1 visit.  Date: 12/19/2023  TX#: 2/8 Date: 1/5/2024  TX#: 3/8 Date: 1/08/24                 TX#: 4/8 Date:  1/15/24               TX#: 5/8 Date: 1/26/2024  Tx#: 6/8 Date: 1/30/2024  Tx#: 7/8   Therex: 38'  NuStep L3 x 6'  Slantboard gastroc stretch 3x30\" B  LTR x 8 ea  PPT x 10  Supine cervical retractions x 8  Seated scap retractions 1x10, 1x8  Doorway pec stretch: trial of low, mid, high. 1x15\" B defer 2/2 L shld pain  Seated SB roll for lumbar flex stretch x 10  Seated HS stretch 3x15\" ea  Education: ice vs heat. Pt to self-assess for how long symptoms take to settle to baseline after visit today. Therex: 43'  UBE L1 2' fwd, 2' bwd (UE only)  Prone (over 1 pillow) scap retractions 2x10  Prone (over 1 pillow) quad stretch with strap 3x30\" ea  Seated HS stretch 3x20\" ea  Seated figure-4 piriformis stretch 3x20\" ea  Footwear possible recommendations: [employer] William slip ins, Oofos (to avoid having to bend/ strain to don & tie footwear)  Seated cervical retractions x 20  Seated SB roll for lumbar flex stretch x 10  Trial of flat back stretch @ // bars (with BUE folded/ bent) - defer  Standing lumbar ext small range x 3 Therex x 40 min   Nu step x 6 min , level 5  Supine :  Cervical retraction AROM x 10 x 2   Cervical retraction with self OP x 10 x 2   Pectoral stretch  x 10 with 10 sec hold   Serratus punches AROM x 10   R/L piriformis stretch ( modified figure 4 ) R/L x 10 reps with 10 sec hold   PPT x 10  TrA activation with adductors x 10 with 5 sec hold   TrA activation with abductors with fitness ring x 10 with 5 sec hold   Bridging ( 1/2 ) x 10  Prone :  QUEENIE  2 x 10   Standing :  TANJA with hips over fulcrum  2 x 10  Therex x 40 min   NU step x 6 min , level 4   Standing :  Pectoral stretch in doorway x 5 with 10 sec hold   Standing against 1/2 FR :  Scapula retraction AROM x 20  B sh rows x 20  B sh ext x 20  Supine :  PPT x 20  TrA activation with adductors with yellow ball x 20  TrA activation with abductors activation with fitness [place] x 10   TrA activation single knee fall out x 10    LTR ( mid range ) x 10  TrA activation with alt heel lift x 10 x 2  Therex: 35'  Discharge Planning  NuStep L5 x 6' BUE & BLE    Supine :  PPT x 20  TrA activation with adductors with yellow ball x 25  TrA activation with abductors activation with fitness [place] x 15  TrA activation single knee fall out x 10  LTR ( mid range ) x 10    Standing against 1/2 FR :  Scapula retraction AROM x 20    B sh rows x 10 Y  B shld ext x 10 Y    Manual Therapy: 10'  Gentle small range cervical traction  Light suboccipital release  Light STM B UT & distal scalenes  Gr 1-2 B first rib depression mob Therex: 35'  1/2 FR stretch on table with folded towel at coccyx. X 2' - defer 2/2 increased coccygeal discomfort  Standing against 1/2 FR at wall: Scapula retraction AROM x 20; \"claps\" low height x 20  Supine cervical retractions x 15  R/L modified figure-4 piriformis stretch 10x10\" ea  Hooklying hip Abd ortega with Blue TB 5\" x 20 - HEP  Hooklying hip Add ortega with yellow ball squeeze 5\" x 20 - HEP  Discharge Planning  Manual Therapy: 10'  Gentle small range cervical traction  Light suboccipital release  Gr 1-2 B first rib depression mob   HEP:  Access Code: [email or web address]  URL: [email or web address]  Date: 12/12/2023  Prepared by: Federica Barrientos    Exercises  - Supine Posterior Pelvic Tilt  - 1 x daily - 7 x weekly - 1-2 sets - 10 reps - 2-3 sec hold  - Supine Lower Trunk Rotation  - 1-2 x daily - 7 x weekly - 1 sets - 10 reps - 3-5 sec hold  - Seated Scapular Retraction  - 1-2 x daily - 7 x weekly - 1 sets - 10 reps - 2-3 sec hold    Access Code: [email or web address]  URL: [email or web address]  Date: 01/30/2024  Prepared by: Federica Barrientos    Exercises  - Hooklying Isometric Hip Abduction with Belt  - 1 x daily - 3 x weekly - 1-2 sets - 10 reps - 5 sec hold (Blue TheraBand)  - Supine Hip Adduction Isometric with Ball  - 1 x daily - 3 x weekly - 1-2 sets - 10 reps - 5 sec hold (or folded pillow)    Charges: Therex x 2, Manual x 1       Total  Timed Treatment: 45 min  Total Treatment Time: 45 min

## 2024-02-05 NOTE — PROGRESS NOTES
Diagnosis:   Neck pain (M54.2)  Upper back pain (M54.9)  Bilateral hip pain (M25.551,M25.552)      Referring Provider: Kyleigh  Date of Evaluation:   12/12/2023    Precautions:  Drug Allergy    hx R patellar dislocation & R shoulder dislocation Next MD visit:   none scheduled  Date of Surgery: n/a   Ins[place] Primary/Secondary: [place]     # Auth Visits: auth after 30v             Discharge Summary  Pt has attended 8 visits in Physical Therapy.    Subjective: Pt reports placing pillow under knees with sleeping has helped. Pt reports recent manual techniques helped with temporary relief. Continues with HEP. Feels the pelvic tilt loosened her up. Trying to move more vs sitting. Thinks when she sits too long everything starts flaring up. Pt has a phone call out to MD office to schedule follow up visit.  Current functional limitations include prolonged sitting, bending forward, lifting/ carrying, turning head for driving, sleeping through the night    Pt reports 40-45% overall improvement    Pain: 4/10 LBP      Objective: See Flowsheet for details  2/5/2024:  Lumbar AROM: (* denotes performed with pain)  Flexion: Mod restricted*  Sidebending: R Mod restricted*; L Mod restricted*  Rotation: R Mod restricted*; L Mod restricted*    Cervical AROM: (* denotes performed with pain)  Rotation: R Min-Mod restricted*; L Min-Mod restricted* with tightness with both      Assessment: Pt has completed 8/8 visits on skilled PT POC. Pt reports 40-45% overall improvement. As symptoms continue to be noted despite skilled PT intervention, pt would benefit from follow up with MD for re-assessment & next steps. Pt is in agreement for discharge from current PT POC at this time & will continue with HEP as well as follow up with MD. Pt advised to contact PT if questions/ concerns arise while performing HEP. Pt is aware that if symptoms recur or increase, pt may be appropriate to return to skilled PT under new POC w/  updated RX if deemed medically necessary. Pt is in agreement with discharge plans. Thank you.      Goals: (to be met in 8 visits)   Pt will improve transversus abdominis recruitment to perform proper isometric contraction without requiring verbal or tactile cuing to promote advancement of therex - met  Pt will demonstrate good understanding of proper posture and body mechanics to decrease pain and improve spinal safety as well as have improved tolerance to prolonged sitting for work duties. - met  Pt will improve lumbar spine AROM flexion to WNL to allow increase ease with bending forward to don shoes - no change  Pt will demonstrate improved core strength to be able to perform lifting/ carrying 10# with <3/10 pain - no change  Pt will improve cervical AROM rotation to Min restricted or better R/L to improve tolerance for turning head to check blind spot while driving - improved  Pt will report the ability to sleep at night waking max of 1x from pain for improved ability to function daytime with work duties - no change  Pt will be independent and compliant with comprehensive HEP to maintain progress achieved in PT - met    Plan: Discharge to [facility]  Neck Disability Index Score  Score: 42 % (12/12/2023  9:02 AM)    Post Neck Disability Index Score  Post Score: 54 % (2/5/2024  6:07 PM)    -12 % improvement    Patient/Family/Caregiver was advised of these findings, precautions, and treatment options and has agreed to actively participate in planning and for this course of care.    Thank you for your referral. If you have any questions, please contact me at Dept: [phone].    Sincerely,  Electronically signed by therapist: Federica Barrientos, PT     Physician's certification required:  No  Please co-sign or sign and return this letter via fax as soon as possible to [phone].   I certify the need for these services furnished under this plan of treatment and while under my  care.    X___________________________________________________ Date____________________    Certification From: 2/5/2024  To:5/5/2024     Date: 12/19/2023  TX#: 2/8 Date: 1/5/2024  TX#: 3/8 Date: 1/08/24                TX#: 4/8 Date:  1/15/24               TX#: 5/8 Date: 1/26/2024  Tx#: 6/8 Date: 1/30/2024  Tx#: 7/8 Date: 2/5/2024  Tx#: 8/8  Discharge   Therex: 38'  NuStep L3 x 6'  Slantboard gastroc stretch 3x30\" B  LTR x 8 ea  PPT x 10  Supine cervical retractions x 8  Seated scap retractions 1x10, 1x8  Doorway pec stretch: trial of low, mid, high. 1x15\" B defer 2/2 L shld pain  Seated SB roll for lumbar flex stretch x 10  Seated HS stretch 3x15\" ea  Education: ice vs heat. Pt to self-assess for how long symptoms take to settle to baseline after visit today. Therex: 43'  UBE L1 2' fwd, 2' bwd (UE only)  Prone (over 1 pillow) scap retractions 2x10  Prone (over 1 pillow) quad stretch with strap 3x30\" ea  Seated HS stretch 3x20\" ea  Seated figure-4 piriformis stretch 3x20\" ea  Footwear possible recommendations: Kizik, Sketchers slip ins, Oofos (to avoid having to bend/ strain to don & tie footwear)  Seated cervical retractions x 20  Seated SB roll for lumbar flex stretch x 10  Trial of flat back stretch @ // bars (with BUE folded/ bent) - defer  Standing lumbar ext small range x 3 Therex x 40 min   Nu step x 6 min , level 5  Supine :  Cervical retraction AROM x 10 x 2   Cervical retraction with self OP x 10 x 2   Pectoral stretch  x 10 with 10 sec hold   Serratus punches AROM x 10   R/L piriformis stretch ( modified figure 4 ) R/L x 10 reps with 10 sec hold   PPT x 10  TrA activation with adductors x 10 with 5 sec hold   TrA activation with abductors with fitness ring x 10 with 5 sec hold   Bridging ( 1/2 ) x 10  Prone :  QUEENIE  2 x 10   Standing :  TANJA with hips over fulcrum  2 x 10  Therex x 40 min   NU step x 6 min , level 4   Standing :  Pectoral stretch in doorway x 5 with 10 sec hold   Standing against 1/2 FR  :  Scapula retraction AROM x 20  B sh rows x 20  B sh ext x 20  Supine :  PPT x 20  TrA activation with adductors with yellow ball x 20  TrA activation with abductors activation with fitness [place] x 10   TrA activation single knee fall out x 10   LTR ( mid range ) x 10  TrA activation with alt heel lift x 10 x 2  Therex: 35'  Discharge Planning  NuStep L5 x 6' BUE & BLE    Supine :  PPT x 20  TrA activation with adductors with yellow ball x 25  TrA activation with abductors activation with fitness [place] x 15  TrA activation single knee fall out x 10  LTR ( mid range ) x 10    Standing against 1/2 FR :  Scapula retraction AROM x 20    B sh rows x 10 Y  B shld ext x 10 Y    Manual Therapy: 10'  Gentle small range cervical traction  Light suboccipital release  Light STM B UT & distal scalenes  Gr 1-2 B first rib depression mob Therex: 35'  1/2 FR stretch on table with folded towel at coccyx. X 2' - defer 2/2 increased coccygeal discomfort  Standing against 1/2 FR at wall: Scapula retraction AROM x 20; \"claps\" low height x 20  Supine cervical retractions x 15  R/L modified figure-4 piriformis stretch 10x10\" ea  Hooklying hip Abd ortega with Blue TB 5\" x 20 - HEP  Hooklying hip Add ortega with yellow ball squeeze 5\" x 20 - HEP  Discharge Planning  Manual Therapy: 10'  Gentle small range cervical traction  Light suboccipital release  Gr 1-2 B first rib depression mob Therex: 34'  Discharge Instructions  Re-assessment  HEP review: declined needing any additional printouts  NuStep L6 x 5'  PPT x 20  LTR (mid range) x 10  Seated HS stretch 3x20\" ea     HEP:  Access Code: [email or web address]  URL: [email or web address]  Date: 12/12/2023  Prepared by: Federica Barrientos    Exercises  - Supine Posterior Pelvic Tilt  - 1 x daily - 7 x weekly - 1-2 sets - 10 reps - 2-3 sec hold  - Supine Lower Trunk Rotation  - 1-2 x daily - 7 x weekly - 1 sets - 10 reps - 3-5 sec hold  - Seated Scapular Retraction  - 1-2 x daily - 7 x weekly - 1 sets - 10  reps - 2-3 sec hold    Access Code: [email or web address]  URL: [email or web address]  Date: 01/30/2024  Prepared by: Federica Barrientos    Exercises  - Hooklying Isometric Hip Abduction with Belt  - 1 x daily - 3 x weekly - 1-2 sets - 10 reps - 5 sec hold (Blue TheraBand)  - Supine Hip Adduction Isometric with Ball  - 1 x daily - 3 x weekly - 1-2 sets - 10 reps - 5 sec hold (or folded pillow)    Charges: Therex x 2       Total Timed Treatment: 34 min  Total Treatment Time: 34 min

## 2024-05-17 NOTE — TELEPHONE ENCOUNTER
S/w pt on appt she scheduled below.    Pt reports having chronic back and neck pain    Had always taken nsaids but yesterday when she took ibuprofen-she developed hives all over back/face    I offered to add ibuprofen to allergy list d/t hives, she agrees.    Took zyrtec--\"calmed everything down\"    She sts this is not the first time it has happened, she had wondered if it was coloring in the pill she took previously.    Denies having any throat/tongue swelling or sob.    She will keep appt as scheduled as her sx's are chronic.    I advised if sx's worsen prior to let us know and we can see if we can  her sooner.    She voiced understanding.    Appt notes updated.

## 2024-05-17 NOTE — TELEPHONE ENCOUNTER
Appointment For: Dai Cannon ([id number])   Visit Type: MYCHART FOLLOW UP (9592)      5/29/2024    9:30 AM  30 mins.  Dorothy Arizmendi         [facility] [address]      Patient Comments:   Neck and back pain started having an allergic reaction to Motrin.     Please triage

## 2024-05-29 NOTE — PATIENT INSTRUCTIONS
Try diclofenac 75 mg cautiously if you doing okay,  you can take it twice a day with food.  Cyclobenzaprine 10 mg at bedtime.  See physiatrist for evaluation.  If your feet will still continue bothering you will see foot specialist.  Schedule complete physical for this year.
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
## 2024-06-07 NOTE — PATIENT INSTRUCTIONS
Please start gabapentin titration.    WEEK  AM  NOON  PM   1 - - 300mg   2 300mg - 300mg   3 300mg 300mg 300mg     Call to schedule the EMG/NCS    Become more consistent with the muscle relaxer, break it in half to see if you tolerate it better     Get an Xray of your low back today with flexion and extension views.     I have ordered a cervical spine MRI, however we can wait on doing this until we have the EMG results.

## 2024-06-26 NOTE — TELEPHONE ENCOUNTER
LOV: 5/29/2024  for:Reevaluation of the neck pain and upper back pain.  Patient advised to RTC on:PRN  Medication Quantity Refills Start End   diclofenac 75 MG Oral Tab EC 30 tablet 0 5/29/2024 --   Sig:   Take 1 tablet (75 mg total) by mouth 2 (two) times daily

## 2025-05-07 NOTE — TELEPHONE ENCOUNTER
Rec'd referral and order for EMG from [facility].Placed on [facility] desk to bring to [place]. Appt is 5/15/2025.(Order is in ppwrk, not in referral )

## 2025-05-15 NOTE — PATIENT INSTRUCTIONS
Refill policies:    Allow 2-3 business days for refills; controlled substances may take longer.  Contact your pharmacy at least 5 days prior to running out of medication and have them send an electronic request or submit request through the “request refill” option in your [employer] account.  Refills are not addressed on weekends; covering physicians do not authorize routine medications on weekends.  No narcotics or controlled substances are refilled after noon on Fridays or by on call physicians.  By law, narcotics must be electronically prescribed.  A 30 day supply with no refills is the maximum allowed.  If your prescription is due for a refill, you may be due for a follow up appointment.  To best provide you care, patients receiving routine medications need to be seen at least once a year.  Patients receiving narcotic/controlled substance medications need to be seen at least once every 3 months.  In the event that your preferred pharmacy does not have the requested medication in stock (e.g. Backordered), it is your responsibility to find another pharmacy that has the requested medication available.  We will gladly send a new prescription to that pharmacy at your request.    Scheduling Tests:    If your physician has ordered radiology tests such as MRI or CT scans, please contact Central Scheduling at [phone] right away to schedule the test.  Once scheduled, the [facility] Centralized Referral Team will work with your insurance carrier to obtain pre-certification or prior authorization.  Depending on your insurance carrier, approval may take 3-10 days.  It is highly recommended patients assure they have received an authorization before having a test performed.  If test is done without insurance authorization, patient may be responsible for the entire amount billed.      Precertification and Prior Authorizations:  If your physician has recommended that you have a procedure or additional testing performed the [facility]  Centralized Referral Team will contact your insurance carrier to obtain pre-certification or prior authorization.    You are strongly encouraged to contact your insurance carrier to verify that your procedure/test has been approved and is a COVERED benefit.  Although the [facility] Centralized Referral Team does its due diligence, the insurance carrier gives the disclaimer that \"Although the procedure is authorized, this does not guarantee payment.\"    Ultimately the patient is responsible for payment.   Thank you for your understanding in this matter.  Paperwork Completion:  If you require FMLA or disability paperwork for your recovery, please make sure to either drop it off or have it faxed to our office at [phone]. Be sure the form has your name and date of birth on it.  The form will be faxed to our Forms Department and they will complete it for you.  There is a 25$ fee for all forms that need to be filled out.  Please be aware there is a 10-14 day turnaround time.  You will need to sign a release of information (PANCHITO) form if your paperwork does not come with one.  You may call the Forms Department with any questions at [phone].  Their fax number is [phone].

## 2025-05-15 NOTE — PROCEDURES
[address] [place] [address]   [phone]        Full Name: Dai Cannon Gender: Female  Patient ID: [id number] YOB: 1975      Visit Date: 5/15/2025 11:25 AM  Age: 49 Years  Examining Physician: Son Knowles  Referring Physician: Wilber Watson  Height: 5 feet 2 inch  Weight: 167 lbs  History:     This is a 49 year old female, who presents for evaluation of tingling in digits 3-5 bilaterally and neck pain for the past 20 yrs; NCS/EMG requested to evaluate for possible carpal tunnel syndrome or atypical neuropathy.     Exam  Motor: 5/5 throughout UE  DTRs: 2+, symmetric in UE  Sensory: intact to pin, light touch , vibration and proprioception in UE    Tinel's / phalen's sign absent      Sensory NCS      Nerve / Sites Rec. Site Onset Lat Peak Lat NP Amp PP Amp Segments Distance Peak Diff Velocity Comment     ms ms µV µV  cm ms m/s    R Median - Dig II (Antidromic)      Wrist Index 2.14 3.02 38.9 68.5 Wrist - Index 14  66       Ref.  <=3.30 <=4.00 >=11.0 >=13.0 Ref.       L Median - Dig II (Antidromic)      Wrist Index 2.34 2.97 38.5 64.4 Wrist - Index 14  60       Ref.  <=3.30 <=4.00 >=11.0 >=13.0 Ref.       R Ulnar - Dig V (Antidromic)      Wrist Dig V 2.03 2.86 42.1 33.9 Wrist - Dig V 12  59       Ref.  <=3.10 <=4.00 >=11.0 >=8.0 Ref.       L Ulnar - Dig V (Antidromic)      Wrist Dig V 2.08 2.92 31.8 49.2 Wrist - Dig V 12  58       Ref.  <=3.10 <=4.00 >=11.0 >=8.0 Ref.       R Radial - Superficial (Antidromic)      Forearm Wrist 1.56 2.19 55.9 27.5 Forearm - Wrist 10  64       Ref.  <=2.20 <=2.80 >=7.0 >=11.0 Ref.       L Radial - Superficial (Antidromic)      Forearm Wrist 1.61 2.19 31.9 27.5 Forearm - Wrist 10  62       Ref.  <=2.20 <=2.80 >=7.0 >=11.0 Ref.       L Median, Ulnar - Transcarpal comparison      Median Palm Wrist 1.25 1.67 69.2 64.7 Median Palm - Wrist 8  64       Ulnar Palm Wrist 1.35 1.82 27.3 18.6  Ulnar Palm - Wrist 8  59          Median Palm - Ulnar Palm  -0.16           Ref.  <=0.40         Motor NCS      Nerve / Sites Muscle Latency Amplitude Segments Dist. Lat Diff Velocity Comments     ms mV  cm ms m/s    R Median - APB      Wrist APB 2.88 12.7 Wrist - APB 7         Ref.  <=4.40 >=4.2 Ref.          Elbow APB 6.48 12.6 Elbow - Wrist 22.5 3.60 62.4       Ref.    Ref.   >=51.0    L Median - APB      Wrist APB 2.79 12.2 Wrist - APB 7         Ref.  <=4.40 >=4.2 Ref.          Elbow APB 6.40 12.4 Elbow - Wrist 22 3.60 61.0       Ref.    Ref.   >=51.0    R Ulnar - ADM      Wrist ADM 2.13 9.3 Wrist - ADM 7         Ref.  <=3.70 >=7.9 Ref.          B.Elbow ADM 5.52 10.1 B.Elbow - Wrist 21 3.40 61.8       Ref.    Ref.   >=52.0    L Ulnar - ADM      Wrist ADM 2.10 10.1 Wrist - ADM 7         Ref.  <=3.70 >=7.9 Ref.          B.Elbow ADM 5.40 10.0 B.Elbow - Wrist 20.5 3.29 62.3       Ref.    Ref.   >=52.0       Axilla ADM 7.00 8.8 Axilla - B.Elbow 10 1.60 62.3        F  Wave      Nerve M Latency F Latency    ms ms   R Median - APB 3.2 24.8   Ref.  <=25.7   R Ulnar - ADM 2.3 25.1   Ref.  <=26.1   L Median - APB 2.7 23.5   Ref.  <=25.7   L Ulnar - ADM 2.3 24.4   Ref.  <=26.1       EMG Summary Table     Spontaneous MUAP Recruitment   Muscle IA Fib PSW Fasc H.F. Amp Dur. PPP Pattern   L. Deltoid N None None None None N N N N   L. Triceps brachii N None None None None 1+ 1+ N ?reduced and poly?   L. Biceps brachii N None None None None N N N N   L. Extensor digitorum communis N None None None None N N N N   L. First dorsal interosseous N None None None None 1+ 1+ N ? reduced    R. Deltoid N None None None None N N N N   R. Triceps brachii N None None None None N N N N   R. Biceps brachii N None None None None N N N N   R. Extensor digitorum communis N None None None None N N N N   R. First dorsal interosseous N None None None None N N N N   L. Cervical paraspinals N None None None None N N N N   R. Cervical paraspinals N  None None None None N N N N       Summary    Nerve conduction studies:   Right median sensory response was normal.  Left median sensory response was normal.  Right ulnar sensory response was normal.  Left ulnar sensory response was normal.  Right radial sensory response was normal.  Left radial sensory response was normal.  Left median to ulnar palmar mixed nerve comparison study was normal and demonstrated no significant inter-latency prolongation of median relative to ulnar nerve.  Right median motor response was normal; F-wave response was normal.  Left median motor response was normal; F-wave response was normal.  Right ulnar motor response was normal; F-wave response was normal.  Left ulnar motor response was normal, with no significant slowing across the elbow; F-wave response was normal.    EMG (needle exam)  Concentric needle EMG was performed on the selected muscles listed above in the table, with the following findings:  No increased insertional or spontaneous activity was noted in any other muscles tested  Motor unit potentials demonstrated increased amplitude and duration with reduced recruitment in the left triceps muscle and the left FDI muscle; cervical paraspinal muscles were unremarkable, but limited by patient tolerance.      Conclusion:   This is a borderline abnormal study.  There is no electrophysiologic evidence to suggest an underlying large fiber polyneuropathy, primary demyelinating neuropathy, myopathy, or median or ulnar entrapment neuropathy in either upper extremity.  There is, however, evidence for chronic denervation changes in the left triceps and left FDI muscle, which could suggest a left sided chronic cervical radiculopathy.  Imaging studies and/or clinical correlation is advised if not completed; there is no clear evidence for an acute cervical radiculopathy but the absence of this finding would not rule this out.     Son Knowles MD, Neurology  [facility]  [place]  Pager [phone]  5/15/2025

## (undated) DIAGNOSIS — E55.9 VITAMIN D DEFICIENCY: Primary | ICD-10-CM

## (undated) DEVICE — PDS II VLT 0 107CM AG ST3: Brand: ENDOLOOP

## (undated) DEVICE — LAP CHOLE/APPY CDS-LF: Brand: MEDLINE INDUSTRIES, INC.

## (undated) DEVICE — DERMABOND CLOSURE 0.7ML TOPICL

## (undated) DEVICE — PENCIL TELESCOPE MEGADYNE SE

## (undated) DEVICE — TROCAR: Brand: KII SHIELDED BLADED ACCESS SYSTEM

## (undated) DEVICE — SUT VICRYL 0 UR-6 J603H

## (undated) DEVICE — SUT MONOCRYL 4-0 PS-2 Y496G

## (undated) DEVICE — ECHELON 3000 45 STANDARD: Brand: ECHELON

## (undated) DEVICE — THE ECHELON, ECHELON ENDOPATH™ AND ECHELON FLEX™ FAMILIES OF ENDOSCOPIC LINEAR CUTTERS AND RELOADS ARE STERILE, SINGLE PATIENT USE INSTRUMENTS THAT SIMULTANEOUSLY CUT AND STAPLE TISSUE. THERE ARE SIX STAGGERED ROWS OF STAPLES, THREE ON EITHER SIDE OF THE CUT LINE. THE 45 MM INSTRUMENTS HAVE A STAPLE LINE THATIS APPROXIMATELY 45 MM LONG AND A CUT LINE THAT IS APPROXIMATELY 42 MM LONG. THE SHAFT CAN ROTATE FREELY IN BOTH DIRECTIONS AND AN ARTICULATION MECHANISM ON ARTICULATING INSTRUMENTS ENABLES BENDING THE DISTAL PORTIONOF THE SHAFT TO FACILITATE LATERAL ACCESS OF THE OPERATIVE SITE.THE INSTRUMENTS ARE SHIPPED WITHOUT A RELOAD AND MUST BE LOADED PRIOR TO USE. A STAPLE RETAINING CAP ON THE RELOAD PROTECTS THE STAPLE LEG POINTS DURING SHIPPING AND TRANSPORTATION. THE INSTRUMENTS’ LOCK-OUT FEATURE IS DESIGNED TO PREVENT A USED RELOAD FROM BEING REFIRED.: Brand: ECHELON ENDOPATH

## (undated) DEVICE — STERILE POLYISOPRENE POWDER-FREE SURGICAL GLOVES WITH EMOLLIENT COATING: Brand: PROTEXIS

## (undated) DEVICE — ENDOPATH ULTRA VERESS INSUFFLATION NEEDLES WITH LUER LOCK CONNECTORS: Brand: ENDOPATH

## (undated) DEVICE — TRAY SURESTEP 16 BARDEX UMETR

## (undated) DEVICE — TROCAR: Brand: KII FIOS FIRST ENTRY

## (undated) DEVICE — APPLICATOR CHLORAPREP 26ML

## (undated) DEVICE — SUPER ATRAUMATIC MODIFIED GRASPER TIP, DISPOSABLE: Brand: RENEW

## (undated) DEVICE — LIGHT HANDLE

## (undated) DEVICE — LAPAROVUE VISIBILITY SYSTEM LAPAROSCOPIC SOLUTIONS: Brand: LAPAROVUE

## (undated) DEVICE — SLEEVE KENDALL SCD EXPRESS MED

## (undated) DEVICE — TROCAR: Brand: KII® SLEEVE

## (undated) DEVICE — HUNTER GASPER TIP, DISPOSABLE: Brand: RENEW

## (undated) DEVICE — MEGADYNE ELECTRODE ADULT PT RT

## (undated) DEVICE — POUCH SPECIMEN WIRE 6X3 250ML

## (undated) DEVICE — STERILE POLYISOPRENE POWDER-FREE SURGICAL GLOVES: Brand: PROTEXIS

## (undated) DEVICE — 40580 - THE PINK PAD - ADVANCED TRENDELENBURG POSITIONING KIT: Brand: 40580 - THE PINK PAD - ADVANCED TRENDELENBURG POSITIONING KIT

## (undated) DEVICE — SOL NACL IRRIG 0.9% 1000ML BTL

## (undated) DEVICE — #15 STERILE STAINLESS BLADE: Brand: STERILE STAINLESS BLADES

## (undated) NOTE — LETTER
2023    Return to Work    Name: Hussein Cervantes        : 1975    To Whom It May Concern,    Dai Trujillo had surgery on 2023 and is:    Able to return to work on 2023 with the following restrictions:  No lifting over: 15 lbs until 2023. If there are any further questions, regarding this patient's care, please contact the patient directly.     Sincerely,    Carly Caraballo MD

## (undated) NOTE — ED AVS SNAPSHOT
Daryle Montes   MRN: [id number]    Department:  [facility] Emergency Department   Date of Visit:  4/12/2019           Disclosure     Insurance plans vary and the physician(s) referred by the ER may not be covered by your plan.  Please contact yo tell this physician (or your personal doctor if your instructions are to return to your personal doctor) about any new or lasting problems. The primary care or specialist physician will see patients referred from the [facility] Emergency Department.  Dom Bhagat

## (undated) NOTE — LETTER
AUTHORIZATION FOR SURGICAL OPERATION OR OTHER PROCEDURE    1. I hereby authorize Dr. Kellee Mcdonald, and [address] [place] [address] staff assigned to my case to perform the following operation and/or procedure at the [address] [place] [address]:    _______________________________________________________________________________________________    ENDOSEE WITH POSSIBLE IUD REMOVAL    _______________________________________________________________________________________________    2. My physician has explained the nature and purpose of the operation or other procedure, possible alternative methods of treatment, the risks involved, and the possibility of complication to me. I acknowledge that no guarantee has been made as to the result that may be obtained. 3.  I recognize that, during the course of this operation, or other procedure, unforseen conditions may necessitate additional or different procedure than those listed above. I, therefore, further authorize and request that the above named physician, his/her physician assistants or designees perform such procedures as are, in his/her professional opinion, necessary and desirable. 4.  Any tissue or organs removed in the operation or other procedure may be disposed of by and at the discretion of the [address] [place] [address] and [facility]. 5.  I understand that in the event of a medical emergency, I will be transported by local paramedics to [facility] or other hospital emergency department. 6.  I certify that I have read and fully understand the above consent to operation and/or other procedure. 7.  I acknowledge that my physician has explained sedation/analgesia administration to me including the risks and benefits. I consent to the administration of sedation/analgesia as may be necessary or desirable in the judgement of my physician.     Witness signature: ___________________________________________________ Date:  ______/______/_____                    Time: ________ A. M.  P.M. Patient Name:  ______________________________________________________  (please print)      Patient signature:  ___________________________________________________             Relationship to Patient:           []  Parent    Responsible person                          []  Spouse  In case of minor or                    [] Other  _____________   Incompetent name:  __________________________________________________                               (please print)      _____________      Responsible person  In case of minor or  Incompetent signature:  _______________________________________________    Statement of Physician  My signature below affirms that prior to the time of the procedure, I have explained to the patient and/or his/her guardian, the risks and benefits involved in the proposed treatment and any reasonable alternative to the proposed treatment. I have also explained the risks and benefits involved in the refusal of the proposed treatment and have answered the patient's questions.                         Date:  ______/______/_______  Provider                      Signature:  __________________________________________________________       Time:  ___________ A.M    P.M.

## (undated) NOTE — LETTER
AUTHORIZATION FOR SURGICAL OPERATION OR OTHER PROCEDURE    1. I hereby authorize Dr. Mary Lou Morris, and [address] [place] [address] staff assigned to my case to perform the following operation and/or procedure at the [address] [place] [address]:    _______________________________________________________________________________________________    ENDOSEE WITH POSSIBLE IUD REMOVAL AND POSSIBLE IUD INSERTION    _______________________________________________________________________________________________    2. My physician has explained the nature and purpose of the operation or other procedure, possible alternative methods of treatment, the risks involved, and the possibility of complication to me. I acknowledge that no guarantee has been made as to the result that may be obtained. 3.  I recognize that, during the course of this operation, or other procedure, unforseen conditions may necessitate additional or different procedure than those listed above. I, therefore, further authorize and request that the above named physician, his/her physician assistants or designees perform such procedures as are, in his/her professional opinion, necessary and desirable. 4.  Any tissue or organs removed in the operation or other procedure may be disposed of by and at the discretion of the [address] [place] [address] and [facility]. 5.  I understand that in the event of a medical emergency, I will be transported by local paramedics to [facility] or other hospital emergency department. 6.  I certify that I have read and fully understand the above consent to operation and/or other procedure. 7.  I acknowledge that my physician has explained sedation/analgesia administration to me including the risks and benefits. I consent to the administration of sedation/analgesia as may be necessary or desirable in the judgement of my physician.     Witness signature: ___________________________________________________ Date: ______/______/_____                    Time:  ________ A. M.  P.M. Patient Name:  ______________________________________________________  (please print)      Patient signature:  ___________________________________________________             Relationship to Patient:           []  Parent    Responsible person                          []  Spouse  In case of minor or                    [] Other  _____________   Incompetent name:  __________________________________________________                               (please print)      _____________      Responsible person  In case of minor or  Incompetent signature:  _______________________________________________    Statement of Physician  My signature below affirms that prior to the time of the procedure, I have explained to the patient and/or his/her guardian, the risks and benefits involved in the proposed treatment and any reasonable alternative to the proposed treatment. I have also explained the risks and benefits involved in the refusal of the proposed treatment and have answered the patient's questions.                         Date:  ______/______/_______  Provider                      Signature:  __________________________________________________________       Time:  ___________ A.M    P.M.

## (undated) NOTE — MR AVS SNAPSHOT
After Visit Summary   10/29/2019    Juan Carlos Ansari    MRN: [id number]           Visit Information     Date & Time  10/29/2019  5:15 PM Provider  Indira Harper, [address], [place], [address]  Phone  [phone] HPV HIGH RISK , THIN PREP COLLECTION [[id number] CUSTOM]     THINPREP PAP SMEAR B [[id number] CUSTOM]     THINPREP PAP SMEAR ONLY [[id number] CUSTOM]     Future Labs/Procedures Expected by Expires    HPV HIGH RISK , THIN PREP COLLECTION [[id number] CUSTOM]  10/29/201 increments are effective and add up over the week   2 ½ hours per week – spread out over time Use a bhavin to keep you motivated   Don’t forget strength training with weights and resistance Set goals and track your progress   You don’t need to join a gym. Saturday - Sunday  10:00 am - 4:00 pm      [address]  Monday - Friday  4:00 pm - 10:00 pm  Saturday - Sunday  10:00 am - 4:00 pm       Conditions needing urgent attention, but are not life-threatening.          Average cost  $120*       EMERGENCY ROOM